# Patient Record
Sex: MALE | Race: WHITE | Employment: UNEMPLOYED | URBAN - METROPOLITAN AREA
[De-identification: names, ages, dates, MRNs, and addresses within clinical notes are randomized per-mention and may not be internally consistent; named-entity substitution may affect disease eponyms.]

---

## 2020-06-10 PROBLEM — R10.11 RIGHT UPPER QUADRANT PAIN: Status: ACTIVE | Noted: 2020-06-10

## 2020-06-11 ENCOUNTER — HOSPITAL ENCOUNTER (INPATIENT)
Age: 55
LOS: 5 days | Discharge: HOME | DRG: 263 | End: 2020-06-16
Attending: INTERNAL MEDICINE | Admitting: INTERNAL MEDICINE
Payer: MEDICAID

## 2020-06-11 ENCOUNTER — APPOINTMENT (OUTPATIENT)
Dept: ULTRASOUND IMAGING | Age: 55
DRG: 263 | End: 2020-06-11
Attending: INTERNAL MEDICINE
Payer: MEDICAID

## 2020-06-11 PROBLEM — K80.50 CHOLEDOCHOLITHIASIS: Status: ACTIVE | Noted: 2020-06-11

## 2020-06-11 PROBLEM — K42.9 UMBILICAL HERNIA: Status: ACTIVE | Noted: 2020-06-11

## 2020-06-11 PROBLEM — E66.9 OBESITY: Status: ACTIVE | Noted: 2020-06-11

## 2020-06-11 PROBLEM — I10 ESSENTIAL HYPERTENSION: Status: ACTIVE | Noted: 2020-06-11

## 2020-06-11 LAB
ABSOLUTE EOS #: 0.19 K/UL (ref 0–0.44)
ABSOLUTE IMMATURE GRANULOCYTE: <0.03 K/UL (ref 0–0.3)
ABSOLUTE LYMPH #: 0.9 K/UL (ref 1.1–3.7)
ABSOLUTE MONO #: 0.46 K/UL (ref 0.1–1.2)
ALBUMIN SERPL-MCNC: 3.7 G/DL (ref 3.5–5.2)
ALBUMIN/GLOBULIN RATIO: 1 (ref 1–2.5)
ALP BLD-CCNC: 279 U/L (ref 40–129)
ALT SERPL-CCNC: 237 U/L (ref 5–41)
ANION GAP SERPL CALCULATED.3IONS-SCNC: 15 MMOL/L (ref 9–17)
AST SERPL-CCNC: 151 U/L
BASOPHILS # BLD: 0 % (ref 0–2)
BASOPHILS ABSOLUTE: <0.03 K/UL (ref 0–0.2)
BILIRUB SERPL-MCNC: 4.68 MG/DL (ref 0.3–1.2)
BUN BLDV-MCNC: 10 MG/DL (ref 6–20)
BUN/CREAT BLD: ABNORMAL (ref 9–20)
CALCIUM SERPL-MCNC: 8.9 MG/DL (ref 8.6–10.4)
CHLORIDE BLD-SCNC: 104 MMOL/L (ref 98–107)
CO2: 18 MMOL/L (ref 20–31)
CREAT SERPL-MCNC: 0.83 MG/DL (ref 0.7–1.2)
DIFFERENTIAL TYPE: ABNORMAL
EOSINOPHILS RELATIVE PERCENT: 2 % (ref 1–4)
GFR AFRICAN AMERICAN: >60 ML/MIN
GFR NON-AFRICAN AMERICAN: >60 ML/MIN
GFR SERPL CREATININE-BSD FRML MDRD: ABNORMAL ML/MIN/{1.73_M2}
GFR SERPL CREATININE-BSD FRML MDRD: ABNORMAL ML/MIN/{1.73_M2}
GLUCOSE BLD-MCNC: 98 MG/DL (ref 70–99)
HCT VFR BLD CALC: 45.2 % (ref 40.7–50.3)
HEMOGLOBIN: 14.8 G/DL (ref 13–17)
IMMATURE GRANULOCYTES: 0 %
LIPASE: 23 U/L (ref 13–60)
LYMPHOCYTES # BLD: 12 % (ref 24–43)
MCH RBC QN AUTO: 31.2 PG (ref 25.2–33.5)
MCHC RBC AUTO-ENTMCNC: 32.7 G/DL (ref 28.4–34.8)
MCV RBC AUTO: 95.2 FL (ref 82.6–102.9)
MONOCYTES # BLD: 6 % (ref 3–12)
NRBC AUTOMATED: 0 PER 100 WBC
PDW BLD-RTO: 13.4 % (ref 11.8–14.4)
PLATELET # BLD: 233 K/UL (ref 138–453)
PLATELET ESTIMATE: ABNORMAL
PMV BLD AUTO: 9.4 FL (ref 8.1–13.5)
POTASSIUM SERPL-SCNC: 3.9 MMOL/L (ref 3.7–5.3)
RBC # BLD: 4.75 M/UL (ref 4.21–5.77)
RBC # BLD: ABNORMAL 10*6/UL
SEG NEUTROPHILS: 80 % (ref 36–65)
SEGMENTED NEUTROPHILS ABSOLUTE COUNT: 6.18 K/UL (ref 1.5–8.1)
SODIUM BLD-SCNC: 137 MMOL/L (ref 135–144)
TOTAL PROTEIN: 7.3 G/DL (ref 6.4–8.3)
TROPONIN INTERP: NORMAL
TROPONIN INTERP: NORMAL
TROPONIN T: NORMAL NG/ML
TROPONIN T: NORMAL NG/ML
TROPONIN, HIGH SENSITIVITY: 6 NG/L (ref 0–22)
TROPONIN, HIGH SENSITIVITY: 7 NG/L (ref 0–22)
WBC # BLD: 7.8 K/UL (ref 3.5–11.3)
WBC # BLD: ABNORMAL 10*3/UL

## 2020-06-11 PROCEDURE — 2500000003 HC RX 250 WO HCPCS: Performed by: NURSE PRACTITIONER

## 2020-06-11 PROCEDURE — 99222 1ST HOSP IP/OBS MODERATE 55: CPT | Performed by: INTERNAL MEDICINE

## 2020-06-11 PROCEDURE — 36415 COLL VENOUS BLD VENIPUNCTURE: CPT

## 2020-06-11 PROCEDURE — 85025 COMPLETE CBC W/AUTO DIFF WBC: CPT

## 2020-06-11 PROCEDURE — 93005 ELECTROCARDIOGRAM TRACING: CPT | Performed by: INTERNAL MEDICINE

## 2020-06-11 PROCEDURE — 6360000002 HC RX W HCPCS: Performed by: INTERNAL MEDICINE

## 2020-06-11 PROCEDURE — 2580000003 HC RX 258: Performed by: INTERNAL MEDICINE

## 2020-06-11 PROCEDURE — 1200000000 HC SEMI PRIVATE

## 2020-06-11 PROCEDURE — 76705 ECHO EXAM OF ABDOMEN: CPT

## 2020-06-11 PROCEDURE — 80053 COMPREHEN METABOLIC PANEL: CPT

## 2020-06-11 PROCEDURE — U0003 INFECTIOUS AGENT DETECTION BY NUCLEIC ACID (DNA OR RNA); SEVERE ACUTE RESPIRATORY SYNDROME CORONAVIRUS 2 (SARS-COV-2) (CORONAVIRUS DISEASE [COVID-19]), AMPLIFIED PROBE TECHNIQUE, MAKING USE OF HIGH THROUGHPUT TECHNOLOGIES AS DESCRIBED BY CMS-2020-01-R: HCPCS

## 2020-06-11 PROCEDURE — 2580000003 HC RX 258: Performed by: NURSE PRACTITIONER

## 2020-06-11 PROCEDURE — 83690 ASSAY OF LIPASE: CPT

## 2020-06-11 PROCEDURE — 84484 ASSAY OF TROPONIN QUANT: CPT

## 2020-06-11 RX ORDER — POTASSIUM CHLORIDE 7.45 MG/ML
10 INJECTION INTRAVENOUS PRN
Status: DISCONTINUED | OUTPATIENT
Start: 2020-06-11 | End: 2020-06-16 | Stop reason: HOSPADM

## 2020-06-11 RX ORDER — SODIUM CHLORIDE 0.9 % (FLUSH) 0.9 %
10 SYRINGE (ML) INJECTION EVERY 12 HOURS SCHEDULED
Status: DISCONTINUED | OUTPATIENT
Start: 2020-06-11 | End: 2020-06-16 | Stop reason: HOSPADM

## 2020-06-11 RX ORDER — MAGNESIUM SULFATE 1 G/100ML
1 INJECTION INTRAVENOUS PRN
Status: DISCONTINUED | OUTPATIENT
Start: 2020-06-11 | End: 2020-06-16 | Stop reason: HOSPADM

## 2020-06-11 RX ORDER — SODIUM CHLORIDE 9 MG/ML
INJECTION, SOLUTION INTRAVENOUS CONTINUOUS
Status: DISCONTINUED | OUTPATIENT
Start: 2020-06-11 | End: 2020-06-13

## 2020-06-11 RX ORDER — HYDRALAZINE HYDROCHLORIDE 20 MG/ML
10 INJECTION INTRAMUSCULAR; INTRAVENOUS EVERY 6 HOURS PRN
Status: DISCONTINUED | OUTPATIENT
Start: 2020-06-11 | End: 2020-06-16 | Stop reason: HOSPADM

## 2020-06-11 RX ORDER — MORPHINE SULFATE 4 MG/ML
4 INJECTION, SOLUTION INTRAMUSCULAR; INTRAVENOUS
Status: DISCONTINUED | OUTPATIENT
Start: 2020-06-11 | End: 2020-06-15

## 2020-06-11 RX ORDER — ACETAMINOPHEN 325 MG/1
650 TABLET ORAL EVERY 4 HOURS PRN
Status: DISCONTINUED | OUTPATIENT
Start: 2020-06-11 | End: 2020-06-15

## 2020-06-11 RX ORDER — MORPHINE SULFATE 2 MG/ML
2 INJECTION, SOLUTION INTRAMUSCULAR; INTRAVENOUS
Status: DISCONTINUED | OUTPATIENT
Start: 2020-06-11 | End: 2020-06-15

## 2020-06-11 RX ORDER — HYDROCODONE BITARTRATE AND ACETAMINOPHEN 5; 325 MG/1; MG/1
2 TABLET ORAL EVERY 4 HOURS PRN
Status: DISCONTINUED | OUTPATIENT
Start: 2020-06-11 | End: 2020-06-15

## 2020-06-11 RX ORDER — ONDANSETRON 2 MG/ML
4 INJECTION INTRAMUSCULAR; INTRAVENOUS EVERY 6 HOURS PRN
Status: DISCONTINUED | OUTPATIENT
Start: 2020-06-11 | End: 2020-06-16 | Stop reason: HOSPADM

## 2020-06-11 RX ORDER — SODIUM CHLORIDE 0.9 % (FLUSH) 0.9 %
10 SYRINGE (ML) INJECTION PRN
Status: DISCONTINUED | OUTPATIENT
Start: 2020-06-11 | End: 2020-06-16 | Stop reason: HOSPADM

## 2020-06-11 RX ORDER — PROMETHAZINE HYDROCHLORIDE 25 MG/1
12.5 TABLET ORAL EVERY 6 HOURS PRN
Status: DISCONTINUED | OUTPATIENT
Start: 2020-06-11 | End: 2020-06-16 | Stop reason: HOSPADM

## 2020-06-11 RX ORDER — HYDROCODONE BITARTRATE AND ACETAMINOPHEN 5; 325 MG/1; MG/1
1 TABLET ORAL EVERY 4 HOURS PRN
Status: DISCONTINUED | OUTPATIENT
Start: 2020-06-11 | End: 2020-06-15

## 2020-06-11 RX ADMIN — SODIUM CHLORIDE: 9 INJECTION, SOLUTION INTRAVENOUS at 18:00

## 2020-06-11 RX ADMIN — Medication 10 ML: at 20:59

## 2020-06-11 RX ADMIN — FAMOTIDINE 20 MG: 10 INJECTION, SOLUTION INTRAVENOUS at 20:59

## 2020-06-11 RX ADMIN — PIPERACILLIN AND TAZOBACTAM 3.38 G: 3; .375 INJECTION, POWDER, LYOPHILIZED, FOR SOLUTION INTRAVENOUS at 18:01

## 2020-06-11 ASSESSMENT — PAIN SCALES - GENERAL: PAINLEVEL_OUTOF10: 0

## 2020-06-11 NOTE — H&P
[facility]    HISTORY AND PHYSICAL EXAMINATION            Date:   6/11/2020  Patient name:  Jessica Hanson  Date of admission:  6/11/2020  3:17 PM  MRN:   [id number]  Account:  [de-identified]  YOB: 1965  PCP:    Josh Farr DO  Room:   1100/2713-49  Code Status:    Full Code    Chief Complaint:     Abdominal pain    History Obtained From:     patient    History of Present Illness: This is 53years old gentleman who was transferred to us from [facility].  Patient presented there with right upper quadrant pain last night. He said the pain was in the right upper quadrant and sometimes on the left side as well. Also had some nausea but no vomiting. No fevers or chills. Pain was colicky in nature. Moderate to severe in intensity. He went to the emergency department and over there he was given GI cocktail and Dilaudid which helped with his pain. Patient says he has been having issues with this for the last 4 months. He says that it started in February and he was told that he needs to have his gallbladder removed however he was overweight for that so every time he has an attack they give him antibiotics and then sent him home. He said he has been trying to lose weight and has lost around 40 pounds in the last 4 months by eating healthy. This time when he presented to [facility] they called his surgeon who recommended transferring to bigger center for evaluation by GI and general surgery. Patient denies any chest pain. He denies any shortness of breath. He denies any coughing or fevers or chills. 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 He denies any chest pain going up or down stairs or exertion. Denies any other medical problems.   Also he has umbilical hernia for which she was told that they will try to repair it when they do his gallbladder surgery  Past Medical History:     No past

## 2020-06-11 NOTE — CONSULTS
known allergies. Social History:   Social History     Socioeconomic History    Marital status:      Spouse name: Not on file    Number of children: Not on file    Years of education: Not on file    Highest education level: Not on file   Occupational History    Not on file   Social Needs    Financial resource strain: Not on file    Food insecurity     Worry: Not on file     Inability: Not on file   [employer] needs     Medical: Not on file     Non-medical: Not on file   Tobacco Use    Smoking status: Former Smoker     Types: Cigarettes    Smokeless tobacco: Current User   Substance and Sexual Activity    Alcohol use: Not on file    Drug use: Not on file    Sexual activity: Not on file   Lifestyle    Physical activity     Days per week: Not on file     Minutes per session: Not on file    Stress: Not on file   Relationships    Social connections     Talks on phone: Not on file     Gets together: Not on file     Attends Yazidism service: Not on file     Active member of club or organization: Not on file     Attends meetings of clubs or organizations: Not on file     Relationship status: Not on file    Intimate partner violence     Fear of current or ex partner: Not on file     Emotionally abused: Not on file     Physically abused: Not on file     Forced sexual activity: Not on file   Other Topics Concern    Not on file   Social History Narrative    Not on file     Family History:   No family history on file. REVIEW OF SYSTEMS:    CONSTITUTIONAL:  No recent weight gain/loss. Energy level normal for pt. HEENT:  No nasal congestion or drainage. CARDIOVASCULAR:  No chest pain  GASTROINTESTINAL: Admits to RUQ abdominal pain, no nausea or vomiting. No constipation/diarrhea. No rectal bleeding  GENITOURINARY:  No dysuria  HEMATOLOGIC/LYMPHATIC:  No easy bruising or bleeding.   No history of cancer  ENDOCRINE: occasional hot or cold intolerance, no h/o DM, no polydipsia   Review of may require MRCP if common bile duct dilation on RUQ US or if hepatic function panel labs are uptrending   4. GI consulted by primary team. Will follow up results.    5. Serial abdominal exams       Electronically signed by Matthew Tatum DO

## 2020-06-12 ENCOUNTER — APPOINTMENT (OUTPATIENT)
Dept: GENERAL RADIOLOGY | Age: 55
DRG: 263 | End: 2020-06-12
Attending: INTERNAL MEDICINE
Payer: MEDICAID

## 2020-06-12 ENCOUNTER — ANESTHESIA (OUTPATIENT)
Dept: OPERATING ROOM | Age: 55
DRG: 263 | End: 2020-06-12
Payer: MEDICAID

## 2020-06-12 ENCOUNTER — ANESTHESIA EVENT (OUTPATIENT)
Dept: OPERATING ROOM | Age: 55
DRG: 263 | End: 2020-06-12
Payer: MEDICAID

## 2020-06-12 VITALS — OXYGEN SATURATION: 97 % | SYSTOLIC BLOOD PRESSURE: 171 MMHG | DIASTOLIC BLOOD PRESSURE: 151 MMHG | TEMPERATURE: 95 F

## 2020-06-12 PROBLEM — E66.09 OBESITY DUE TO EXCESS CALORIES: Status: ACTIVE | Noted: 2020-06-11

## 2020-06-12 PROBLEM — E80.6 HYPERBILIRUBINEMIA: Status: ACTIVE | Noted: 2020-06-12

## 2020-06-12 LAB
ALBUMIN SERPL-MCNC: 3.5 G/DL (ref 3.5–5.2)
ALBUMIN/GLOBULIN RATIO: 1.1 (ref 1–2.5)
ALP BLD-CCNC: 256 U/L (ref 40–129)
ALT SERPL-CCNC: 178 U/L (ref 5–41)
AMYLASE: 31 U/L (ref 28–100)
ANION GAP SERPL CALCULATED.3IONS-SCNC: 15 MMOL/L (ref 9–17)
AST SERPL-CCNC: 99 U/L
BILIRUB SERPL-MCNC: 3.62 MG/DL (ref 0.3–1.2)
BUN BLDV-MCNC: 9 MG/DL (ref 6–20)
BUN/CREAT BLD: ABNORMAL (ref 9–20)
CALCIUM IONIZED: 1.19 MMOL/L (ref 1.13–1.33)
CALCIUM SERPL-MCNC: 8.6 MG/DL (ref 8.6–10.4)
CHLORIDE BLD-SCNC: 105 MMOL/L (ref 98–107)
CO2: 20 MMOL/L (ref 20–31)
CREAT SERPL-MCNC: 0.75 MG/DL (ref 0.7–1.2)
EKG ATRIAL RATE: 78 BPM
EKG P AXIS: 17 DEGREES
EKG P-R INTERVAL: 182 MS
EKG Q-T INTERVAL: 370 MS
EKG QRS DURATION: 94 MS
EKG QTC CALCULATION (BAZETT): 421 MS
EKG R AXIS: -15 DEGREES
EKG T AXIS: 23 DEGREES
EKG VENTRICULAR RATE: 78 BPM
GFR AFRICAN AMERICAN: >60 ML/MIN
GFR NON-AFRICAN AMERICAN: >60 ML/MIN
GFR SERPL CREATININE-BSD FRML MDRD: ABNORMAL ML/MIN/{1.73_M2}
GFR SERPL CREATININE-BSD FRML MDRD: ABNORMAL ML/MIN/{1.73_M2}
GLUCOSE BLD-MCNC: 133 MG/DL (ref 70–99)
HCT VFR BLD CALC: 43.4 % (ref 40.7–50.3)
HEMOGLOBIN: 13.9 G/DL (ref 13–17)
INR BLD: 1
LIPASE: 22 U/L (ref 13–60)
MAGNESIUM: 2.1 MG/DL (ref 1.6–2.6)
MCH RBC QN AUTO: 30.8 PG (ref 25.2–33.5)
MCHC RBC AUTO-ENTMCNC: 32 G/DL (ref 28.4–34.8)
MCV RBC AUTO: 96.2 FL (ref 82.6–102.9)
NRBC AUTOMATED: 0 PER 100 WBC
PDW BLD-RTO: 13.6 % (ref 11.8–14.4)
PHOSPHORUS: 2.3 MG/DL (ref 2.5–4.5)
PLATELET # BLD: 196 K/UL (ref 138–453)
PMV BLD AUTO: 9.7 FL (ref 8.1–13.5)
POTASSIUM SERPL-SCNC: 4.3 MMOL/L (ref 3.7–5.3)
PROTHROMBIN TIME: 10.3 SEC (ref 9–12)
RBC # BLD: 4.51 M/UL (ref 4.21–5.77)
SARS-COV-2, PCR: NORMAL
SARS-COV-2, RAPID: NORMAL
SARS-COV-2: NOT DETECTED
SODIUM BLD-SCNC: 140 MMOL/L (ref 135–144)
SOURCE: NORMAL
TOTAL PROTEIN: 6.7 G/DL (ref 6.4–8.3)
TRIGL SERPL-MCNC: 112 MG/DL
WBC # BLD: 6.6 K/UL (ref 3.5–11.3)

## 2020-06-12 PROCEDURE — 6360000002 HC RX W HCPCS: Performed by: INTERNAL MEDICINE

## 2020-06-12 PROCEDURE — 2709999900 HC NON-CHARGEABLE SUPPLY: Performed by: INTERNAL MEDICINE

## 2020-06-12 PROCEDURE — 97165 OT EVAL LOW COMPLEX 30 MIN: CPT

## 2020-06-12 PROCEDURE — 74328 X-RAY BILE DUCT ENDOSCOPY: CPT

## 2020-06-12 PROCEDURE — 43262 ENDO CHOLANGIOPANCREATOGRAPH: CPT | Performed by: INTERNAL MEDICINE

## 2020-06-12 PROCEDURE — 93010 ELECTROCARDIOGRAM REPORT: CPT | Performed by: INTERNAL MEDICINE

## 2020-06-12 PROCEDURE — 6360000002 HC RX W HCPCS: Performed by: NURSE ANESTHETIST, CERTIFIED REGISTERED

## 2020-06-12 PROCEDURE — 3700000001 HC ADD 15 MINUTES (ANESTHESIA): Performed by: INTERNAL MEDICINE

## 2020-06-12 PROCEDURE — 83690 ASSAY OF LIPASE: CPT

## 2020-06-12 PROCEDURE — C1769 GUIDE WIRE: HCPCS | Performed by: INTERNAL MEDICINE

## 2020-06-12 PROCEDURE — 2580000003 HC RX 258: Performed by: INTERNAL MEDICINE

## 2020-06-12 PROCEDURE — 3700000000 HC ANESTHESIA ATTENDED CARE: Performed by: INTERNAL MEDICINE

## 2020-06-12 PROCEDURE — 1200000000 HC SEMI PRIVATE

## 2020-06-12 PROCEDURE — 97535 SELF CARE MNGMENT TRAINING: CPT

## 2020-06-12 PROCEDURE — 2580000003 HC RX 258: Performed by: NURSE ANESTHETIST, CERTIFIED REGISTERED

## 2020-06-12 PROCEDURE — 84100 ASSAY OF PHOSPHORUS: CPT

## 2020-06-12 PROCEDURE — 83735 ASSAY OF MAGNESIUM: CPT

## 2020-06-12 PROCEDURE — 82330 ASSAY OF CALCIUM: CPT

## 2020-06-12 PROCEDURE — 2500000003 HC RX 250 WO HCPCS: Performed by: NURSE ANESTHETIST, CERTIFIED REGISTERED

## 2020-06-12 PROCEDURE — 85027 COMPLETE CBC AUTOMATED: CPT

## 2020-06-12 PROCEDURE — 0FC98ZZ EXTIRPATION OF MATTER FROM COMMON BILE DUCT, VIA NATURAL OR ARTIFICIAL OPENING ENDOSCOPIC: ICD-10-PCS | Performed by: INTERNAL MEDICINE

## 2020-06-12 PROCEDURE — 84478 ASSAY OF TRIGLYCERIDES: CPT

## 2020-06-12 PROCEDURE — 85610 PROTHROMBIN TIME: CPT

## 2020-06-12 PROCEDURE — 3609018800 HC ERCP DX COLLECTION SPECIMEN BRUSHING/WASHING: Performed by: INTERNAL MEDICINE

## 2020-06-12 PROCEDURE — 36415 COLL VENOUS BLD VENIPUNCTURE: CPT

## 2020-06-12 PROCEDURE — 99254 IP/OBS CNSLTJ NEW/EST MOD 60: CPT | Performed by: INTERNAL MEDICINE

## 2020-06-12 PROCEDURE — 0F798ZZ DILATION OF COMMON BILE DUCT, VIA NATURAL OR ARTIFICIAL OPENING ENDOSCOPIC: ICD-10-PCS | Performed by: INTERNAL MEDICINE

## 2020-06-12 PROCEDURE — 80053 COMPREHEN METABOLIC PANEL: CPT

## 2020-06-12 PROCEDURE — 2500000003 HC RX 250 WO HCPCS: Performed by: INTERNAL MEDICINE

## 2020-06-12 PROCEDURE — 99232 SBSQ HOSP IP/OBS MODERATE 35: CPT | Performed by: INTERNAL MEDICINE

## 2020-06-12 PROCEDURE — 76937 US GUIDE VASCULAR ACCESS: CPT

## 2020-06-12 PROCEDURE — 82150 ASSAY OF AMYLASE: CPT

## 2020-06-12 PROCEDURE — 7100000000 HC PACU RECOVERY - FIRST 15 MIN: Performed by: INTERNAL MEDICINE

## 2020-06-12 PROCEDURE — 2500000003 HC RX 250 WO HCPCS: Performed by: NURSE PRACTITIONER

## 2020-06-12 PROCEDURE — 43264 ERCP REMOVE DUCT CALCULI: CPT | Performed by: INTERNAL MEDICINE

## 2020-06-12 PROCEDURE — 6370000000 HC RX 637 (ALT 250 FOR IP): Performed by: INTERNAL MEDICINE

## 2020-06-12 PROCEDURE — 7100000001 HC PACU RECOVERY - ADDTL 15 MIN: Performed by: INTERNAL MEDICINE

## 2020-06-12 PROCEDURE — 2720000010 HC SURG SUPPLY STERILE: Performed by: INTERNAL MEDICINE

## 2020-06-12 PROCEDURE — BF101ZZ FLUOROSCOPY OF BILE DUCTS USING LOW OSMOLAR CONTRAST: ICD-10-PCS | Performed by: INTERNAL MEDICINE

## 2020-06-12 PROCEDURE — 99222 1ST HOSP IP/OBS MODERATE 55: CPT | Performed by: SURGERY

## 2020-06-12 PROCEDURE — 6360000004 HC RX CONTRAST MEDICATION: Performed by: INTERNAL MEDICINE

## 2020-06-12 RX ORDER — FENTANYL CITRATE 50 UG/ML
50 INJECTION, SOLUTION INTRAMUSCULAR; INTRAVENOUS EVERY 5 MIN PRN
Status: DISCONTINUED | OUTPATIENT
Start: 2020-06-12 | End: 2020-06-12

## 2020-06-12 RX ORDER — PROPOFOL 10 MG/ML
INJECTION, EMULSION INTRAVENOUS PRN
Status: DISCONTINUED | OUTPATIENT
Start: 2020-06-12 | End: 2020-06-12 | Stop reason: SDUPTHER

## 2020-06-12 RX ORDER — SODIUM CHLORIDE 0.9 % (FLUSH) 0.9 %
10 SYRINGE (ML) INJECTION EVERY 12 HOURS SCHEDULED
Status: CANCELLED | OUTPATIENT
Start: 2020-06-12

## 2020-06-12 RX ORDER — FENTANYL CITRATE 50 UG/ML
25 INJECTION, SOLUTION INTRAMUSCULAR; INTRAVENOUS ONCE
Status: CANCELLED | OUTPATIENT
Start: 2020-06-12 | End: 2020-06-12

## 2020-06-12 RX ORDER — SODIUM CHLORIDE, SODIUM LACTATE, POTASSIUM CHLORIDE, CALCIUM CHLORIDE 600; 310; 30; 20 MG/100ML; MG/100ML; MG/100ML; MG/100ML
INJECTION, SOLUTION INTRAVENOUS CONTINUOUS
Status: CANCELLED | OUTPATIENT
Start: 2020-06-12

## 2020-06-12 RX ORDER — SODIUM CHLORIDE 9 MG/ML
INJECTION, SOLUTION INTRAVENOUS CONTINUOUS PRN
Status: DISCONTINUED | OUTPATIENT
Start: 2020-06-12 | End: 2020-06-12 | Stop reason: SDUPTHER

## 2020-06-12 RX ORDER — ONDANSETRON 2 MG/ML
INJECTION INTRAMUSCULAR; INTRAVENOUS PRN
Status: DISCONTINUED | OUTPATIENT
Start: 2020-06-12 | End: 2020-06-12 | Stop reason: SDUPTHER

## 2020-06-12 RX ORDER — SODIUM CHLORIDE 0.9 % (FLUSH) 0.9 %
10 SYRINGE (ML) INJECTION PRN
Status: CANCELLED | OUTPATIENT
Start: 2020-06-12

## 2020-06-12 RX ORDER — ROCURONIUM BROMIDE 10 MG/ML
INJECTION, SOLUTION INTRAVENOUS PRN
Status: DISCONTINUED | OUTPATIENT
Start: 2020-06-12 | End: 2020-06-12 | Stop reason: SDUPTHER

## 2020-06-12 RX ORDER — ONDANSETRON 2 MG/ML
4 INJECTION INTRAMUSCULAR; INTRAVENOUS
Status: DISCONTINUED | OUTPATIENT
Start: 2020-06-12 | End: 2020-06-12

## 2020-06-12 RX ORDER — LIDOCAINE HYDROCHLORIDE 10 MG/ML
INJECTION, SOLUTION INFILTRATION; PERINEURAL PRN
Status: DISCONTINUED | OUTPATIENT
Start: 2020-06-12 | End: 2020-06-12 | Stop reason: SDUPTHER

## 2020-06-12 RX ORDER — FENTANYL CITRATE 50 UG/ML
INJECTION, SOLUTION INTRAMUSCULAR; INTRAVENOUS PRN
Status: DISCONTINUED | OUTPATIENT
Start: 2020-06-12 | End: 2020-06-12 | Stop reason: SDUPTHER

## 2020-06-12 RX ORDER — MIDAZOLAM HYDROCHLORIDE 1 MG/ML
2 INJECTION INTRAMUSCULAR; INTRAVENOUS
Status: CANCELLED | OUTPATIENT
Start: 2020-06-12 | End: 2020-06-12

## 2020-06-12 RX ORDER — ONDANSETRON 2 MG/ML
4 INJECTION INTRAMUSCULAR; INTRAVENOUS ONCE
Status: CANCELLED | OUTPATIENT
Start: 2020-06-12 | End: 2020-06-12

## 2020-06-12 RX ORDER — AMLODIPINE BESYLATE 5 MG/1
5 TABLET ORAL DAILY
Status: DISCONTINUED | OUTPATIENT
Start: 2020-06-12 | End: 2020-06-14

## 2020-06-12 RX ADMIN — PROPOFOL 300 MG: 10 INJECTION, EMULSION INTRAVENOUS at 13:42

## 2020-06-12 RX ADMIN — LIDOCAINE HYDROCHLORIDE 50 MG: 10 INJECTION, SOLUTION INFILTRATION; PERINEURAL at 13:42

## 2020-06-12 RX ADMIN — SODIUM CHLORIDE: 9 INJECTION, SOLUTION INTRAVENOUS at 18:43

## 2020-06-12 RX ADMIN — FAMOTIDINE 20 MG: 10 INJECTION, SOLUTION INTRAVENOUS at 20:16

## 2020-06-12 RX ADMIN — PIPERACILLIN AND TAZOBACTAM 3.38 G: 3; .375 INJECTION, POWDER, LYOPHILIZED, FOR SOLUTION INTRAVENOUS at 18:33

## 2020-06-12 RX ADMIN — SUGAMMADEX 300 MG: 100 INJECTION, SOLUTION INTRAVENOUS at 14:22

## 2020-06-12 RX ADMIN — ENOXAPARIN SODIUM 40 MG: 40 INJECTION SUBCUTANEOUS at 20:16

## 2020-06-12 RX ADMIN — ROCURONIUM BROMIDE 50 MG: 10 INJECTION INTRAVENOUS at 13:42

## 2020-06-12 RX ADMIN — FENTANYL CITRATE 100 MCG: 50 INJECTION INTRAMUSCULAR; INTRAVENOUS at 13:42

## 2020-06-12 RX ADMIN — HYDRALAZINE HYDROCHLORIDE 10 MG: 20 INJECTION INTRAMUSCULAR; INTRAVENOUS at 16:51

## 2020-06-12 RX ADMIN — AMLODIPINE BESYLATE 5 MG: 5 TABLET ORAL at 20:16

## 2020-06-12 RX ADMIN — ONDANSETRON 4 MG: 2 INJECTION, SOLUTION INTRAMUSCULAR; INTRAVENOUS at 14:17

## 2020-06-12 RX ADMIN — SODIUM CHLORIDE: 9 INJECTION, SOLUTION INTRAVENOUS at 13:33

## 2020-06-12 RX ADMIN — PIPERACILLIN AND TAZOBACTAM 3.38 G: 3; .375 INJECTION, POWDER, LYOPHILIZED, FOR SOLUTION INTRAVENOUS at 08:49

## 2020-06-12 RX ADMIN — FAMOTIDINE 20 MG: 10 INJECTION, SOLUTION INTRAVENOUS at 08:49

## 2020-06-12 RX ADMIN — PIPERACILLIN AND TAZOBACTAM 3.38 G: 3; .375 INJECTION, POWDER, LYOPHILIZED, FOR SOLUTION INTRAVENOUS at 01:00

## 2020-06-12 ASSESSMENT — PULMONARY FUNCTION TESTS
PIF_VALUE: 23
PIF_VALUE: 19
PIF_VALUE: 31
PIF_VALUE: 24
PIF_VALUE: 0
PIF_VALUE: 2
PIF_VALUE: 1
PIF_VALUE: 23
PIF_VALUE: 31
PIF_VALUE: 2
PIF_VALUE: 2
PIF_VALUE: 0
PIF_VALUE: 18
PIF_VALUE: 32
PIF_VALUE: 32
PIF_VALUE: 24
PIF_VALUE: 23
PIF_VALUE: 23
PIF_VALUE: 30
PIF_VALUE: 22
PIF_VALUE: 26
PIF_VALUE: 33
PIF_VALUE: 9
PIF_VALUE: 23
PIF_VALUE: 3
PIF_VALUE: 24
PIF_VALUE: 31
PIF_VALUE: 1
PIF_VALUE: 34
PIF_VALUE: 3
PIF_VALUE: 2
PIF_VALUE: 32
PIF_VALUE: 32
PIF_VALUE: 3
PIF_VALUE: 32
PIF_VALUE: 29
PIF_VALUE: 22
PIF_VALUE: 23
PIF_VALUE: 18
PIF_VALUE: 31
PIF_VALUE: 32
PIF_VALUE: 0
PIF_VALUE: 34
PIF_VALUE: 31
PIF_VALUE: 1
PIF_VALUE: 31
PIF_VALUE: 31
PIF_VALUE: 12
PIF_VALUE: 31
PIF_VALUE: 19
PIF_VALUE: 22
PIF_VALUE: 23
PIF_VALUE: 2
PIF_VALUE: 23
PIF_VALUE: 19
PIF_VALUE: 32
PIF_VALUE: 24
PIF_VALUE: 34

## 2020-06-12 ASSESSMENT — PAIN - FUNCTIONAL ASSESSMENT: PAIN_FUNCTIONAL_ASSESSMENT: 0-10

## 2020-06-12 ASSESSMENT — PAIN SCALES - GENERAL
PAINLEVEL_OUTOF10: 0
PAINLEVEL_OUTOF10: 0

## 2020-06-12 NOTE — PROGRESS NOTES
[address]    Progress Note    6/12/2020    12:45 PM    Name:   Shahnaz Khan  MRN:     [id number]     Acct:      [de-identified]   Room:   ST OR [place] RM/NONE  IP Day:  1  Admit Date:  6/11/2020  3:17 PM    PCP:   Shawn Lam DO  Code Status:  Full Code    Subjective:     C/C: No chief complaint on file. RUQ pain  Interval History Status: improved. Patient says his pain is better, denies chest pain, denies shortness of breath, he does have elevated bilirubin, blood pressure is slightly on the higher side but says this could be related to pain    Brief History: This is 53years old gentleman who was transferred to us from [facility].  Patient presented there with right upper quadrant pain last night. He said the pain was in the right upper quadrant and sometimes on the left side as well. Also had some nausea but no vomiting. No fevers or chills. Pain was colicky in nature. Moderate to severe in intensity. He went to the emergency department and over there he was given GI cocktail and Dilaudid which helped with his pain. Patient says he has been having issues with this for the last 4 months. He says that it started in February and he was told that he needs to have his gallbladder removed however he was overweight for that so every time he has an attack they give him antibiotics and then sent him home. He said he has been trying to lose weight and has lost around 40 pounds in the last 4 months by eating healthy. This time when he presented to [facility] they called his surgeon who recommended transferring to [facility] center for evaluation by GI and general surgery. Patient denies any chest pain. He denies any shortness of breath. He denies any coughing or fevers or chills. 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 He denies any chest pain going up or down stairs or exertion.   Denies any other medical problems. Also he has umbilical hernia for which she was told that they will try to repair it when they do his gallbladder surgery    Review of Systems:     Constitutional:  negative for chills, fevers, sweats  Respiratory:  negative for cough, dyspnea on exertion, shortness of breath, wheezing  Cardiovascular:  negative for chest pain, chest pressure/discomfort, lower extremity edema, palpitations  Gastrointestinal:  negative for abdominal pain, constipation, diarrhea, nausea, vomiting  Neurological:  negative for dizziness, headache    Medications: Allergies:  No Known Allergies    Current Meds:   Scheduled Meds:    [MAR Hold] enoxaparin  40 mg Subcutaneous BID    [MAR Hold] famotidine (PEPCID) injection  20 mg Intravenous BID    [MAR Hold] sodium chloride flush  10 mL Intravenous 2 times per day    [MAR Hold] piperacillin-tazobactam  3.375 g Intravenous Q8H     Continuous Infusions:    [MAR Hold] sodium chloride 75 mL/hr at 06/11/20 1800     PRN Meds: [MAR Hold] fentanNYL, [MAR Hold] fentanNYL, [MAR Hold] ondansetron, [MAR Hold] acetaminophen, [MAR Hold] HYDROcodone 5 mg - acetaminophen **OR** [MAR Hold] HYDROcodone 5 mg - acetaminophen, [MAR Hold] HYDROmorphone **OR** [MAR Hold] HYDROmorphone, [MAR Hold] magnesium sulfate, [MAR Hold] potassium chloride, [MAR Hold] promethazine **OR** [MAR Hold] ondansetron, [MAR Hold] sodium chloride flush, [MAR Hold] morphine **OR** [MAR Hold] morphine, [MAR Hold] hydrALAZINE    Data:     Past Medical History:   has a past medical history of Alcohol use disorder, mild, abuse, Choledocholithiasis, HTN (hypertension), Obesity, Pain, Umbilical hernia, and Weight loss. Social History:   reports that he has quit smoking. His smoking use included cigarettes. He uses smokeless tobacco.     Family History: No family history on file.     Vitals:  BP (!) 179/106   Pulse 81   Temp 98.2 °F (36.8 °C) (Temporal)   Resp 20   Ht 5' 11\" (1.803 m)   Wt (!) 343 lb (155.6 kg)   SpO2 98%   BMI 47.84 kg/m²   Temp (24hrs), Av.5 °F (36.9 °C), Min:98.2 °F (36.8 °C), Max:98.7 °F (37.1 °C)    No results for input(s): POCGLU in the last 72 hours. I/O (24Hr): Intake/Output Summary (Last 24 hours) at 2020 1245  Last data filed at 2020 0501  Gross per 24 hour   Intake 1370 ml   Output --   Net 1370 ml       Labs:  Hematology:  Recent Labs     209   WBC 7.8 6.6   RBC 4.75 4.51   HGB 14.8 13.9   HCT 45.2 43.4   MCV 95.2 96.2   MCH 31.2 30.8   MCHC 32.7 32.0   RDW 13.4 13.6    196   MPV 9.4 9.7   INR  --  1.0     Chemistry:  Recent Labs     20     --  140   K 3.9  --  4.3     --  105   CO2 18*  --  20   GLUCOSE 98  --  133*   BUN 10  --  9   CREATININE 0.83  --  0.75   MG  --   --  2.1   ANIONGAP 15  --  15   LABGLOM >60  --  >60   GFRAA >60  --  >60   CALCIUM 8.9  --  8.6   CAION  --   --  1.19   PHOS  --   --  2.3*   TROPHS 6 7  --      Recent Labs     20   PROT 7.3 6.7   LABALBU 3.7 3.5   * 99*   * 178*   ALKPHOS 279* 256*   BILITOT 4.68* 3.62*   AMYLASE  --  31   LIPASE 23 22   TRIG  --  112     ABG:No results found for: POCPH, PHART, PH, POCPCO2, NEX8JBM, PCO2, POCPO2, PO2ART, PO2, POCHCO3, WSO5IFA, HCO3, NBEA, PBEA, BEART, BE, THGBART, THB, JNR3HNT, NOFI2BLW, F6IJIWWU, O2SAT, FIO2  No results found for: SPECIAL  No results found for: CULTURE    Radiology:  Us Gallbladder Ruq    Result Date: 2020  Limited study due to body habitus and overlying bowel gas. Diffuse hepatic steatosis. Cholelithiasis without sonographic evidence of acute cholecystitis. There is dilation of the common bile duct. No obvious stone is noted however correlation with patient's liver function test as well as bilirubin levels recommended. MRCP can be obtained as clinically indicated.        Physical Examination:        General appearance:  alert, cooperative and no distress  Mental Status:  oriented to person, place and time and normal affect  Lungs:  clear to auscultation bilaterally, normal effort  Heart:  regular rate and rhythm, no murmur  Abdomen:  soft, nontender, nondistended, normal bowel sounds, no masses, hepatomegaly, splenomegaly  Extremities:  no edema, redness, tenderness in the calves  Skin:  no gross lesions, rashes, induration    Assessment:        Hospital Problems           Last Modified POA    Right upper quadrant pain 6/10/2020 Yes    Choledocholithiasis 9/45/6248 Yes    Umbilical hernia 6/68/5867 Yes    Essential hypertension 6/11/2020 Yes    Morbid obesity 6/12/2020 Yes    Hyperbilirubinemia 6/12/2020 Yes          Plan:        1. Plan for ERCP today because of elevated bilirubin and gallbladder ultrasound findings  2. Continue Zosyn  3. Continue IV fluids  4. Monitor blood pressure and if remains elevated will start him on medication, continue to use hydralazine as needed  5.  DVT prophylaxis after procedure from tomorrow    Altagracia Tang MD  6/12/2020  12:45 PM

## 2020-06-12 NOTE — PROGRESS NOTES
GI team plans for ERCP today 6/12. Will discontinue MRCP. Surgery plans for robotic laparoscopic assisted cholecystectomy following ERCP. Cholecystectomy tentatively Monday 6/15.        Madelin Rene, DO  General Surgery PGY-1

## 2020-06-12 NOTE — PROGRESS NOTES
Nutrition Assessment (Low Risk)    Type and Reason for Visit: Initial, Positive Nutrition Screen(Wt loss)    Nutrition Recommendations:   -Continue NPO status   -Start diet as able   -Pt denied all nutritional supplements at this time      Nutrition Assessment:  Patient assessed for nutritional risk. Pt stated that he intentionally lost 46 lbs over the past 4 mo in prep for his Gallbladder procedure. Pt has avoided fried foods and has been eating more fruits and vegetables. Pt is very happy w/ his wt loss and hopes it continues. Pt stated that he has a good appetite. No wounds noted. Full nutrition assessment not needed at this time. Deemed to be at low risk at this time. Will continue to monitor for changes in status.       Malnutrition Assessment:  · Malnutrition Status: No malnutrition    Nutrition Risk Level   Risk Level: Low    Nutrition Diagnosis:   · Problem: No nutrition diagnosis at this time    Nutrition Intervention:  Food and/or Delivery: Continue NPO(Start diet as able )  Nutrition Education/Counseling/Coordination of Care:  Continued Inpatient Monitoring, Education Not Indicated      Electronically signed by Carmen Cote RD, CATHRYN on 6/12/20 at 12:03 PM EDT    Contact Number: [phone]

## 2020-06-12 NOTE — CARE COORDINATION
Case Management Initial Discharge Plan  Deisy Medina,             Met with:patient to discuss discharge plans. Information verified: address, contacts, phone number, , insurance Yes    Emergency Contact/Next of Kin name & number:  Parent  and wife    PCP: Jai Mandel DO  Date of last visit: 4 months ago  Insurance Provider:  Pending medicaid    Discharge Planning    Living Arrangements:  Spouse/Significant Other   Support Systems:  Family Members, Friends/Neighbors    Home has  2 stories   4-5 steps stairs to climb to get into front door,  I flight stairs to climb to reach second floor  Location of bedroom/bathroom in home  Main level    Patient able to perform ADL's:Independent    Current Services (outpatient & in home) none  DME equipment: none  DME provider:     Receiving oral anticoagulation therapy? No    If indicated:   Physician managing anticoagulation treatment:   Where does patient obtain lab work for ATC treatment? Potential Assistance Needed:  N/A    Patient agreeable to home care: No  [place] of choice provided:  n/a    Prior SNF/Rehab Placement and Facility:   Agreeable to SNF/Rehab: No  [place] of choice provided: n/a     Evaluation: no    Expected Discharge date:  20    Patient expects to be discharged to:  home  Follow Up Appointment: Best Day/ Time: Monday AM    Transportation provider:  family  Transportation arrangements needed for discharge: No    Readmission Risk              Risk of Unplanned Readmission:        8             Does patient have a readmission risk score greater than 14?: No  If yes, follow-up appointment must be made within 7 days of discharge. Goals of Care: determination of tx for his issue.   Has been going to dr  For 4 months to get answers    Discharge Plan:  Home with wife          Electronically signed by Clint Wells RN on 20 at 9:53 AM EDT

## 2020-06-12 NOTE — CONSULTS
[address]    GASTROENTEROLOGY CONSULT    Patient:   Court Khan   :    1965   Facility:   [facility]   Date:    2020  Admission Dx:  Right upper quadrant pain [R10.11]  Requesting physician: Cyn Kerr MD  Reason for consult:  Biliary pain       SUBJECTIVE     HISTORY OF PRESENT ILLNESS  This is a 53 y.o.   male who was admitted 2020 with Right upper quadrant pain [R10.11]. We have been asked to see the patient in consultation by Cyn Kerr MD for biliary pain. 54 y/o male with hx of morbid obesity and alcohol use disorder presented as transfer from [facility] for surgical and GI evaluation after presenting with severe RUQ pain. Patient has a 4 month hx of intermittent abdominal pain. Pain varies between constant and intermittent, sharp and pressure type. Pain will radiate to back and to left upper quadrant. He has had associated chills and rigors. Patient  and has been evaluated in past for cholecystectomy. Patient was advised weight loss prior to surgery. Patient reports he has lost 43 pounds, 8 pounds more than recommended. He was seen at [facility] with ongoing abdominal pain. LFTs were abnormal.  Patient transferred to tertiary center for further care. Right upper quadrant ultrasound shows hepatic steatosis, cholelithiasis without evidence of acute cholecystitis. Dilation of CBD to 14.1 mm. Labs reveal abnormal LFTs suggestive of obstructive pattern -alkaline phosphatase 279, , , total bilirubin 4.68. Patient reports a history of alcohol use and will drink 4-5 alcoholic beverages on social occasions. Denies daily alcohol use. No new or over-the-counter medications. No acetaminophen use. Currently denies any abdominal pain. He denies prior EGD or colonoscopy. OBJECTIVE:     PAST MEDICAL/SURGICAL HISTORY  No past medical history on file.   No past surgical history on Trachea midline  LUNGS:  CTA Bilaterally  HEART:  RRR, No murmur  ABDOMEN:   Soft, Nontender, Nondistended, BS WNL  EXT:   No clubbing. No cyanosis. No edema. SKIN:   No rashes. Jaundice. No stigmata of liver disease. MUSC/SKEL:   Adequate muscle bulk for patient's age, No significant synovitis, No deformities  NEURO:  A&O x Three, CN II- XII grossly intact      LABS AND IMAGING:     CBC  Recent Labs     06/11/20 1745 06/12/20  0449   WBC 7.8 6.6   HGB 14.8 13.9   HCT 45.2 43.4   MCV 95.2 96.2   MCH 31.2 30.8   MCHC 32.7 32.0    196       BMP  Recent Labs     06/11/20 1745 06/12/20  0449    140   K 3.9 4.3    105   CO2 18* 20   BUN 10 9   CREATININE 0.83 0.75   GLUCOSE 98 133*   CALCIUM 8.9 8.6       LFTS  Recent Labs     06/11/20 1745 06/12/20  0449   ALKPHOS 279* 256*   * 178*   * 99*   PROT 7.3 6.7   BILITOT 4.68* 3.62*   LABALBU 3.7 3.5       AMYLASE/LIPASE/AMMONIA  Recent Labs     06/11/20 1745 06/12/20 0449   AMYLASE  --  31   LIPASE 23 22       PT/INR  Recent Labs     06/12/20  0449   PROTIME 10.3   INR 1.0       ANEMIA STUDIES  No results for input(s): IRON, LABIRON, TIBC, UIBC, FERRITIN, [id number], FOLATE, OCCULTBLD in the last 72 hours. IMAGING  Us Gallbladder Ruq    Result Date: 6/11/2020  EXAMINATION: RIGHT UPPER QUADRANT ULTRASOUND 6/11/2020 5:06 pm COMPARISON: None. HISTORY: ORDERING SYSTEM PROVIDED HISTORY: RUQ pain TECHNOLOGIST PROVIDED HISTORY: RUQ pain FINDINGS: Study is limited by body habitus and overlying bowel gas. LIVER:  Liver is diffusely increased in echogenicity. No focal lesion noted on limited imaging. Portal venous flow as in expected direction. BILIARY SYSTEM:  Stones are noted within the gallbladder and there is no wall thickening or pericholecystic fluid. Patient reports no sonographic Gonzales's sign.  Common bile duct is dilated measuring 14.1 mm. RIGHT KIDNEY: The right kidney is grossly unremarkable without evidence of

## 2020-06-12 NOTE — ANESTHESIA PRE PROCEDURE
Tobacco Use    Smoking status: Former Smoker     Types: Cigarettes    Smokeless tobacco: Current User   Substance Use Topics    Alcohol use: Not on file                                Ready to quit: Not Answered  Counseling given: Not Answered      Vital Signs (Current):   Vitals:    06/11/20 2200 06/12/20 0501 06/12/20 1151 06/12/20 1200   BP: (!) 148/91 (!) 146/90 (!) 192/86 (!) 179/106   Pulse:  88 81    Resp:  16 20    Temp:  98.6 °F (37 °C) 98.2 °F (36.8 °C)    TempSrc:  Oral Temporal    SpO2:   98%    Weight:   (!) 343 lb (155.6 kg)    Height:   5' 11\" (1.803 m)                                               BP Readings from Last 3 Encounters:   06/12/20 (!) 179/106       NPO Status: Time of last liquid consumption: 2000                        Time of last solid consumption: 1700                        Date of last liquid consumption: 06/11/20                        Date of last solid food consumption: 06/10/20    BMI:   Wt Readings from Last 3 Encounters:   06/12/20 (!) 343 lb (155.6 kg)     Body mass index is 47.84 kg/m². CBC:   Lab Results   Component Value Date    WBC 6.6 06/12/2020    RBC 4.51 06/12/2020    HGB 13.9 06/12/2020    HCT 43.4 06/12/2020    MCV 96.2 06/12/2020    RDW 13.6 06/12/2020     06/12/2020       CMP:   Lab Results   Component Value Date     06/12/2020    K 4.3 06/12/2020     06/12/2020    CO2 20 06/12/2020    BUN 9 06/12/2020    CREATININE 0.75 06/12/2020    GFRAA >60 06/12/2020    LABGLOM >60 06/12/2020    GLUCOSE 133 06/12/2020    PROT 6.7 06/12/2020    CALCIUM 8.6 06/12/2020    BILITOT 3.62 06/12/2020    ALKPHOS 256 06/12/2020    AST 99 06/12/2020     06/12/2020       POC Tests: No results for input(s): POCGLU, POCNA, POCK, POCCL, POCBUN, POCHEMO, POCHCT in the last 72 hours.     Coags:   Lab Results   Component Value Date    PROTIME 10.3 06/12/2020    INR 1.0 06/12/2020       HCG (If Applicable): No results found for: PREGTESTUR, PREGSERUM, HCG,

## 2020-06-12 NOTE — PROGRESS NOTES
General Surgery:  Daily Progress Note          PATIENT NAME: Pricilla Suárez     TODAY'S DATE: 6/12/2020, 7:05 AM  CC:  RUQ abdominal pain     SUBJECTIVE:     Pt seen and examined at bedside. No acute events overnight. Afebrile, HD stable. Currently without any abdominal pain this AM. Denies nausea, emesis and states he is feeling hungry. RUQ US 6/11 with cholelithiasis and common bile duct dilation, no gallbladder wall thickening or pericholecystic fluid and initial labs on arrival here 6/11 Alk Phos 279, , , TBili 4.68. OBJECTIVE:   VITALS:  BP (!) 146/90   Pulse 88   Temp 98.6 °F (37 °C) (Oral)   Resp 16   Ht 5' 11\" (1.803 m)   Wt (!) 343 lb (155.6 kg)   SpO2 99%   BMI 47.84 kg/m²      INTAKE/OUTPUT:      Intake/Output Summary (Last 24 hours) at 6/12/2020 0705  Last data filed at 6/12/2020 0501  Gross per 24 hour   Intake 1370 ml   Output --   Net 1370 ml       PHYSICAL EXAM:  General Appearance:  awake, alert, oriented, in no acute distress  HEENT:  Normocephalic, atraumatic, mucus membranes moist   Skin:  Skin color, texture, turgor normal. No rashes or lesions. Lungs:  Effort normal, no respiratory distress, no accessory muscle use   Heart:  Regular rate and rhythm   Abdomen: Soft, ND, NTTP, no guarding or peritoneal signs   Extremities: Extremities warm to touch, pink, with no edema.         Data:  CBC:   Lab Results   Component Value Date    WBC 6.6 06/12/2020    RBC 4.51 06/12/2020    HGB 13.9 06/12/2020    HCT 43.4 06/12/2020    MCV 96.2 06/12/2020    MCH 30.8 06/12/2020    MCHC 32.0 06/12/2020    RDW 13.6 06/12/2020     06/12/2020    MPV 9.7 06/12/2020     CMP:    Lab Results   Component Value Date     06/12/2020    K 4.3 06/12/2020     06/12/2020    CO2 20 06/12/2020    BUN 9 06/12/2020    CREATININE 0.75 06/12/2020    GFRAA >60 06/12/2020    LABGLOM >60 06/12/2020    GLUCOSE 133 06/12/2020    PROT 6.7 06/12/2020    LABALBU 3.5 06/12/2020    CALCIUM 8.6

## 2020-06-13 LAB
ALBUMIN SERPL-MCNC: 3.6 G/DL (ref 3.5–5.2)
ALBUMIN/GLOBULIN RATIO: 1 (ref 1–2.5)
ALP BLD-CCNC: 222 U/L (ref 40–129)
ALT SERPL-CCNC: 123 U/L (ref 5–41)
ANION GAP SERPL CALCULATED.3IONS-SCNC: 9 MMOL/L (ref 9–17)
AST SERPL-CCNC: 48 U/L
BILIRUB SERPL-MCNC: 1.1 MG/DL (ref 0.3–1.2)
BUN BLDV-MCNC: 11 MG/DL (ref 6–20)
BUN/CREAT BLD: ABNORMAL (ref 9–20)
CALCIUM SERPL-MCNC: 8.8 MG/DL (ref 8.6–10.4)
CHLORIDE BLD-SCNC: 101 MMOL/L (ref 98–107)
CO2: 23 MMOL/L (ref 20–31)
CREAT SERPL-MCNC: 0.95 MG/DL (ref 0.7–1.2)
GFR AFRICAN AMERICAN: >60 ML/MIN
GFR NON-AFRICAN AMERICAN: >60 ML/MIN
GFR SERPL CREATININE-BSD FRML MDRD: ABNORMAL ML/MIN/{1.73_M2}
GFR SERPL CREATININE-BSD FRML MDRD: ABNORMAL ML/MIN/{1.73_M2}
GLUCOSE BLD-MCNC: 127 MG/DL (ref 70–99)
HCT VFR BLD CALC: 43.9 % (ref 40.7–50.3)
HEMOGLOBIN: 14.3 G/DL (ref 13–17)
MCH RBC QN AUTO: 31.2 PG (ref 25.2–33.5)
MCHC RBC AUTO-ENTMCNC: 32.6 G/DL (ref 28.4–34.8)
MCV RBC AUTO: 95.9 FL (ref 82.6–102.9)
NRBC AUTOMATED: 0 PER 100 WBC
PDW BLD-RTO: 13.6 % (ref 11.8–14.4)
PLATELET # BLD: 242 K/UL (ref 138–453)
PMV BLD AUTO: 9.6 FL (ref 8.1–13.5)
POTASSIUM SERPL-SCNC: 4.2 MMOL/L (ref 3.7–5.3)
RBC # BLD: 4.58 M/UL (ref 4.21–5.77)
SODIUM BLD-SCNC: 133 MMOL/L (ref 135–144)
TOTAL PROTEIN: 7.1 G/DL (ref 6.4–8.3)
WBC # BLD: 6.4 K/UL (ref 3.5–11.3)

## 2020-06-13 PROCEDURE — 85027 COMPLETE CBC AUTOMATED: CPT

## 2020-06-13 PROCEDURE — 1200000000 HC SEMI PRIVATE

## 2020-06-13 PROCEDURE — 6360000002 HC RX W HCPCS: Performed by: INTERNAL MEDICINE

## 2020-06-13 PROCEDURE — APPNB45 APP NON BILLABLE 31-45 MINUTES: Performed by: INTERNAL MEDICINE

## 2020-06-13 PROCEDURE — 99232 SBSQ HOSP IP/OBS MODERATE 35: CPT | Performed by: INTERNAL MEDICINE

## 2020-06-13 PROCEDURE — 2580000003 HC RX 258: Performed by: INTERNAL MEDICINE

## 2020-06-13 PROCEDURE — 6370000000 HC RX 637 (ALT 250 FOR IP): Performed by: INTERNAL MEDICINE

## 2020-06-13 PROCEDURE — 36415 COLL VENOUS BLD VENIPUNCTURE: CPT

## 2020-06-13 PROCEDURE — 80053 COMPREHEN METABOLIC PANEL: CPT

## 2020-06-13 PROCEDURE — 2500000003 HC RX 250 WO HCPCS: Performed by: INTERNAL MEDICINE

## 2020-06-13 RX ADMIN — Medication 10 ML: at 21:25

## 2020-06-13 RX ADMIN — PIPERACILLIN AND TAZOBACTAM 3.38 G: 3; .375 INJECTION, POWDER, LYOPHILIZED, FOR SOLUTION INTRAVENOUS at 08:43

## 2020-06-13 RX ADMIN — FAMOTIDINE 20 MG: 10 INJECTION, SOLUTION INTRAVENOUS at 21:23

## 2020-06-13 RX ADMIN — PIPERACILLIN AND TAZOBACTAM 3.38 G: 3; .375 INJECTION, POWDER, LYOPHILIZED, FOR SOLUTION INTRAVENOUS at 01:24

## 2020-06-13 RX ADMIN — ACETAMINOPHEN 650 MG: 325 TABLET ORAL at 06:43

## 2020-06-13 RX ADMIN — PIPERACILLIN AND TAZOBACTAM 3.38 G: 3; .375 INJECTION, POWDER, LYOPHILIZED, FOR SOLUTION INTRAVENOUS at 16:54

## 2020-06-13 RX ADMIN — ENOXAPARIN SODIUM 40 MG: 40 INJECTION SUBCUTANEOUS at 21:23

## 2020-06-13 RX ADMIN — FAMOTIDINE 20 MG: 10 INJECTION, SOLUTION INTRAVENOUS at 09:33

## 2020-06-13 RX ADMIN — AMLODIPINE BESYLATE 5 MG: 5 TABLET ORAL at 08:43

## 2020-06-13 ASSESSMENT — PAIN SCALES - GENERAL: PAINLEVEL_OUTOF10: 3

## 2020-06-13 NOTE — PLAN OF CARE
Problem: Activity:  Goal: Risk for activity intolerance will decrease  Description: Risk for activity intolerance will decrease  6/13/2020 0316 by Lucian Finch RN  Outcome: Ongoing     Problem: Bowel/Gastric:  Goal: Bowel function will improve  Description: Bowel function will improve  6/13/2020 0316 by Lucian Finch RN  Outcome: Ongoing     Problem: Bowel/Gastric:  Goal: Diagnostic test results will improve  Description: Diagnostic test results will improve  6/13/2020 0316 by Lucian Finch RN  Outcome: Ongoing     Problem: Bowel/Gastric:  Goal: Occurrences of nausea will decrease  Description: Occurrences of nausea will decrease  6/13/2020 0316 by Lucian Finch RN  Outcome: Ongoing     Problem:  Bowel/Gastric:  Goal: Occurrences of vomiting will decrease  Description: Occurrences of vomiting will decrease  6/13/2020 0316 by Lucian Finch RN  Outcome: Ongoing     Problem: Cardiac:  Goal: Ability to maintain an adequate cardiac output will improve  Description: Ability to maintain an adequate cardiac output will improve  Outcome: Ongoing     Problem: Physical Regulation:  Goal: Complications related to the disease process, condition or treatment will be avoided or minimized  Description: Complications related to the disease process, condition or treatment will be avoided or minimized  Outcome: Ongoing     Problem: Safety:  Goal: Ability to remain free from injury will improve  Outcome: Ongoing

## 2020-06-13 NOTE — PROGRESS NOTES
Cuc[facility] 18    Progress Note    6/13/2020    2:09 PM    Name:   Veronica Adams  MRN:     [id number]     Acct:      [de-identified]   Room:   [facility] [address]  IP Day:  2  Admit Date:  6/11/2020  3:17 PM    PCP:   Kelsie Lieberman DO  Code Status:  Full Code    Subjective:     C/C: No chief complaint on file. RUQ pain  Interval History Status: improved. Patient underwent ERCP yesterday with removal of 2 stones. He has significant improvement in his LFTs. His symptoms are better. Blood pressure is better but elevated  Brief History: This is 53years old gentleman who was transferred to us from [facility].  Patient presented there with right upper quadrant pain last night. He said the pain was in the right upper quadrant and sometimes on the left side as well. Also had some nausea but no vomiting. No fevers or chills. Pain was colicky in nature. Moderate to severe in intensity. He went to the emergency department and over there he was given GI cocktail and Dilaudid which helped with his pain. Patient says he has been having issues with this for the last 4 months. He says that it started in February and he was told that he needs to have his gallbladder removed however he was overweight for that so every time he has an attack they give him antibiotics and then sent him home. He said he has been trying to lose weight and has lost around 40 pounds in the last 4 months by eating healthy. This time when he presented to [facility] they called his surgeon who recommended transferring to bigger center for evaluation by GI and general surgery. Patient denies any chest pain. He denies any shortness of breath. He denies any coughing or fevers or chills. 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 He denies any chest pain going up or down stairs or exertion. Denies any other medical problems.   Also he has umbilical 6/13/2020 0526  Gross per 24 hour   Intake 800 ml   Output --   Net 800 ml       Labs:  Hematology:  Recent Labs     06/11/20 1745 06/12/20 0449 06/13/20  0735   WBC 7.8 6.6 6.4   RBC 4.75 4.51 4.58   HGB 14.8 13.9 14.3   HCT 45.2 43.4 43.9   MCV 95.2 96.2 95.9   MCH 31.2 30.8 31.2   MCHC 32.7 32.0 32.6   RDW 13.4 13.6 13.6    196 242   MPV 9.4 9.7 9.6   INR  --  1.0  --      Chemistry:  Recent Labs     06/11/20 1745 06/11/20 1937 06/12/20 0449 06/13/20  0735     --  140 133*   K 3.9  --  4.3 4.2     --  105 101   CO2 18*  --  20 23   GLUCOSE 98  --  133* 127*   BUN 10  --  9 11   CREATININE 0.83  --  0.75 0.95   MG  --   --  2.1  --    ANIONGAP 15  --  15 9   LABGLOM >60  --  >60 >60   GFRAA >60  --  >60 >60   CALCIUM 8.9  --  8.6 8.8   CAION  --   --  1.19  --    PHOS  --   --  2.3*  --    TROPHS 6 7  --   --      Recent Labs     06/11/20 1745 06/12/20 0449 06/13/20  0735   PROT 7.3 6.7 7.1   LABALBU 3.7 3.5 3.6   * 99* 48*   * 178* 123*   ALKPHOS 279* 256* 222*   BILITOT 4.68* 3.62* 1.10   AMYLASE  --  31  --    LIPASE 23 22  --    TRIG  --  112  --      ABG:No results found for: POCPH, PHART, PH, POCPCO2, MIH4CTB, PCO2, POCPO2, PO2ART, PO2, POCHCO3, MSW9JRB, HCO3, NBEA, PBEA, BEART, BE, THGBART, THB, RZD1XYS, ETOA7ISE, K3JCVWSO, O2SAT, FIO2  No results found for: SPECIAL  No results found for: CULTURE    Radiology:  Us Gallbladder Ruq    Result Date: 6/11/2020  Limited study due to body habitus and overlying bowel gas. Diffuse hepatic steatosis. Cholelithiasis without sonographic evidence of acute cholecystitis. There is dilation of the common bile duct. No obvious stone is noted however correlation with patient's liver function test as well as bilirubin levels recommended. MRCP can be obtained as clinically indicated.        Physical Examination:        General appearance:  alert, cooperative and no distress  Mental Status:  oriented to person, place and time and normal affect  Lungs:  clear to auscultation bilaterally, normal effort  Heart:  regular rate and rhythm, no murmur  Abdomen:  soft, nontender, nondistended, normal bowel sounds, no masses, hepatomegaly, splenomegaly  Extremities:  no edema, redness, tenderness in the calves  Skin:  no gross lesions, rashes, induration    Assessment:        Hospital Problems           Last Modified POA    Right upper quadrant pain 6/10/2020 Yes    Choledocholithiasis 2/82/6441 Yes    Umbilical hernia 8/80/9096 Yes    Essential hypertension 6/11/2020 Yes    Morbid obesity 6/12/2020 Yes    Hyperbilirubinemia 6/12/2020 Yes          Plan:        1. Status post ERCP with removal of 2 stones, bilirubin is improving   2. continue Zosyn  3. Stop IV fluids as patient is eating and drinking okay  4. Start Norvasc for blood pressure control,  5.  continue to use hydralazine as needed  6. Plan for cholecystectomy and umbilical hernia repair on Monday  7. Advised to monitor blood pressure at home and watch for any low or high blood pressure  8.  Lovenox for DVT prophylaxis    Gordon Shields MD  6/13/2020  2:09 PM

## 2020-06-13 NOTE — PROGRESS NOTES
THE [facility] Progress Note    Namita Henley is a 53 y.o. male patient. Hospitalization Day:2      Chief consult reason: Possible choledocholithiasis      Subjective: Patient seen and examined. Patient overall doing well. No abdominal pain. Patient does report neck discomfort s/p ERCP as well as mild esophageal discomfort. Able to tolerate breakfast without issues. VITALS:  BP (!) 155/86   Pulse 72   Temp 98.2 °F (36.8 °C) (Oral)   Resp 20   Ht 5' 11\" (1.803 m)   Wt (!) 343 lb (155.6 kg)   SpO2 97%   BMI 47.84 kg/m²   TEMPERATURE:  Current - Temp: 98.2 °F (36.8 °C); Max - Temp  Av.9 °F (35.5 °C)  Min: 93.5 °F (34.2 °C)  Max: 98.5 °F (36.9 °C)    Physical Assessment:  General appearance:  alert, cooperative and no distress  Mental Status:  oriented to person, place and time and normal affect  Lungs:  clear to auscultation bilaterally, normal effort  Heart:  regular rate and rhythm, no murmur  Abdomen:  soft, MO, nontender, nondistended, normal bowel sounds, + umbilical hernia  Extremities:  no edema, redness, tenderness in the calves  Skin:  warm, dry, no gross lesions or rashes    Data Review:  LABS and IMAGING:     CBC  Recent Labs     20  0735   WBC 7.8 6.6 6.4   HGB 14.8 13.9 14.3   HCT 45.2 43.4 43.9   MCV 95.2 96.2 95.9   MCHC 32.7 32.0 32.6   RDW 13.4 13.6 13.6    196 242       Immature PLTs   No results found for: PLTFLUORE    ANEMIA STUDIES  No results for input(s): LABIRON, TIBC, IRON, FERRITIN, VENKLCVS38, FOLATE, OCCULTBLD in the last 72 hours.     BMP  Recent Labs     209 20  0735    140 133*   K 3.9 4.3 4.2    105 101   CO2 18* 20 23   BUN 10 9 11   CREATININE 0.83 0.75 0.95   GLUCOSE 98 133* 127*   CALCIUM 8.9 8.6 8.8       LFTS  Recent Labs     20  1745 20  0449 20  0735   ALKPHOS 279* 256* 222*   * 178* 123*   * 99* 48*   BILITOT 4.68* 3.62* 1.10 LABALBU 3.7 3.5 3.6       AMYLASE/LIPASE/AMMONIA  Recent Labs     06/11/20  1745 06/12/20  0449   AMYLASE  --  31   LIPASE 23 22       Acute Hepatitis Panel   No results found for: HEPBSAG, HEPCAB, HEPBIGM, HEPAIGM    HCV Genotype   No results found for: HEPATITISCGENOTYPE    HCV Quantitative   No results found for: HCVQNT    LIVER WORK UP:    AFP  No results found for: AFP    Alpha 1 antitrypsin   No results found for: A1A    Anti - Liver/Kidney Ab  No results found for: LIVER-KIDNEYMICROSOMALAB    STEW  No results found for: STEW    AMA  No results found for: MITOAB    ASMA  No results found for: SMOOTHMUSCAB    Ceruloplasmin  No results found for: CERULOPLSM    Celiac panel  No results found for: TISSTRNTIIGG, TTGIGA, IGA    IgG  No results found for: IGG    IgM  No results found for: IGM    GGT   No results found for: LABGGT    PT/INR  Recent Labs     06/12/20  0449   PROTIME 10.3   INR 1.0       Cancer Markers:  CEA:  No results for input(s): CEA in the last 72 hours. Ca 125:  No results for input(s):  in the last 72 hours. Ca 19-9:   No results for input(s):  in the last 72 hours. AFP: No results for input(s): AFP in the last 72 hours. Lactic acid:No results for input(s): LACTACIDWB in the last 72 hours. Radiology Review: Fl Ercp Biliary S&i    Result Date: 6/12/2020  EXAMINATION: 4 SPOT IMAGES FROM AN ERCP COMPARISON: 06/11/2020 FLUOROSCOPY DOSE OR TIME/IMAGES: Time: 11.5 seconds Dose: 5.09 mGy, ka, r Exposures/Images: 4 HISTORY: ORDERING SYSTEM PROVIDED HISTORY: intra op TECHNOLOGIST PROVIDED HISTORY: intra op Reason for Exam: intra op images dilated duct. Alkaline phosphatase elevation and bilirubin suggesting tract obstruction FINDINGS: Endoscopy and cannulation of the major papilla was performed by the gastroenterology service under the supervision of Paulette Trevino. Spot views are presented for interpretation.  Dilated duct with multiple filling defects in the distal common duct

## 2020-06-13 NOTE — PROGRESS NOTES
General Surgery:  Daily Progress Note          PATIENT NAME: Cassy Wiggins     TODAY'S DATE: 6/13/2020, 7:45 AM  CC:  RUQ abdominal pain     SUBJECTIVE:     Pt seen and examined at bedside. No acute events overnight. Afebrile, HD stable. POD#1 s/p ERCP with removal of two 8mm stones. No abdominal pain this AM. Denies nausea, emesis. Tolerating low fat diet. Admits to some throat soreness s/p anesthesia. OBJECTIVE:   VITALS:  BP (!) 148/85   Pulse 90   Temp 98.5 °F (36.9 °C) (Oral)   Resp 22   Ht 5' 11\" (1.803 m)   Wt (!) 343 lb (155.6 kg)   SpO2 99%   BMI 47.84 kg/m²      INTAKE/OUTPUT:      Intake/Output Summary (Last 24 hours) at 6/13/2020 0745  Last data filed at 6/13/2020 0526  Gross per 24 hour   Intake 1200 ml   Output --   Net 1200 ml       PHYSICAL EXAM:  General Appearance:  awake, alert, oriented, in no acute distress  HEENT:  Normocephalic, atraumatic, mucus membranes moist   Skin:  Skin color, texture, turgor normal. No rashes or lesions. Lungs:  Effort normal, no respiratory distress, no accessory muscle use   Heart:  Regular rate and rhythm   Abdomen: Soft, ND, NTTP, no guarding or peritoneal signs, nontender umbilical hernia present   Extremities: Extremities warm to touch, pink, with no edema.       Data:  CBC:   Lab Results   Component Value Date    WBC 6.6 06/12/2020    RBC 4.51 06/12/2020    HGB 13.9 06/12/2020    HCT 43.4 06/12/2020    MCV 96.2 06/12/2020    MCH 30.8 06/12/2020    MCHC 32.0 06/12/2020    RDW 13.6 06/12/2020     06/12/2020    MPV 9.7 06/12/2020     CMP:    Lab Results   Component Value Date     06/12/2020    K 4.3 06/12/2020     06/12/2020    CO2 20 06/12/2020    BUN 9 06/12/2020    CREATININE 0.75 06/12/2020    GFRAA >60 06/12/2020    LABGLOM >60 06/12/2020    GLUCOSE 133 06/12/2020    PROT 6.7 06/12/2020    LABALBU 3.5 06/12/2020    CALCIUM 8.6 06/12/2020    BILITOT 3.62 06/12/2020    ALKPHOS 256 06/12/2020    AST 99 06/12/2020     06/12/2020       Radiology Review:    RUQ US  Limited study due to body habitus and overlying bowel gas.       Diffuse hepatic steatosis.       Cholelithiasis without sonographic evidence of acute cholecystitis.       There is dilation of the common bile duct.  No obvious stone is noted however   correlation with patient's liver function test as well as bilirubin levels   recommended.  MRCP can be obtained as clinically indicated. ASSESSMENT:  Active Hospital Problems    Diagnosis Date Noted    Hyperbilirubinemia [E80.6] 06/12/2020    Choledocholithiasis [K80.50] 21/98/6339    Umbilical hernia [I91.0] 06/11/2020    Essential hypertension [I10] 06/11/2020    Morbid obesity [E66.09] 06/11/2020    Right upper quadrant pain [R10.11] 06/10/2020       53 y.o. male with RUQ abdominal pain-cholelithiasis, choledocholithiasis  POD#1 s/p ERCP with removal of two 8mm stones     Plan:  1. Continue medical mgmt and supportive care per primary  2. Diet: low fat   3. Follow up AM labs   4. GI following - POD#1 s/p ERCP  5. Plan for robotic laparoscopic assisted cholecystectomy, umbilical hernia repair Monday 6/15. Surgery including risks/benefits explained and all questions addressed. Consent obtained. 6. Serial abdominal exams       Nicole Thakkar DO  General Surgery PGY-1     I have reviewed the resident's note and I either performed the key elements of the medical history and physical exam or was present with the resident when the key elements of the medical history and physical exam were performed. I have discussed the findings, established the care plan and recommendations with Resident.     Electronically signed by Jose Antonio Mcfadden DO on 6/16/20 at 8:24 AM EDT

## 2020-06-14 ENCOUNTER — ANESTHESIA EVENT (OUTPATIENT)
Dept: OPERATING ROOM | Age: 55
DRG: 263 | End: 2020-06-14
Payer: MEDICAID

## 2020-06-14 PROCEDURE — 6360000002 HC RX W HCPCS: Performed by: INTERNAL MEDICINE

## 2020-06-14 PROCEDURE — 6360000002 HC RX W HCPCS: Performed by: NURSE PRACTITIONER

## 2020-06-14 PROCEDURE — 2580000003 HC RX 258: Performed by: INTERNAL MEDICINE

## 2020-06-14 PROCEDURE — 6370000000 HC RX 637 (ALT 250 FOR IP): Performed by: INTERNAL MEDICINE

## 2020-06-14 PROCEDURE — 1200000000 HC SEMI PRIVATE

## 2020-06-14 PROCEDURE — 2500000003 HC RX 250 WO HCPCS: Performed by: INTERNAL MEDICINE

## 2020-06-14 PROCEDURE — 99232 SBSQ HOSP IP/OBS MODERATE 35: CPT | Performed by: INTERNAL MEDICINE

## 2020-06-14 RX ORDER — HYDRALAZINE HYDROCHLORIDE 20 MG/ML
10 INJECTION INTRAMUSCULAR; INTRAVENOUS ONCE
Status: COMPLETED | OUTPATIENT
Start: 2020-06-14 | End: 2020-06-14

## 2020-06-14 RX ORDER — AMLODIPINE BESYLATE 10 MG/1
10 TABLET ORAL DAILY
Status: DISCONTINUED | OUTPATIENT
Start: 2020-06-14 | End: 2020-06-16 | Stop reason: HOSPADM

## 2020-06-14 RX ORDER — LISINOPRIL 5 MG/1
5 TABLET ORAL DAILY
Status: DISCONTINUED | OUTPATIENT
Start: 2020-06-14 | End: 2020-06-15

## 2020-06-14 RX ADMIN — ENOXAPARIN SODIUM 40 MG: 40 INJECTION SUBCUTANEOUS at 08:50

## 2020-06-14 RX ADMIN — FAMOTIDINE 20 MG: 10 INJECTION, SOLUTION INTRAVENOUS at 08:50

## 2020-06-14 RX ADMIN — HYDRALAZINE HYDROCHLORIDE 10 MG: 20 INJECTION INTRAMUSCULAR; INTRAVENOUS at 22:54

## 2020-06-14 RX ADMIN — LISINOPRIL 5 MG: 5 TABLET ORAL at 12:18

## 2020-06-14 RX ADMIN — FAMOTIDINE 20 MG: 10 INJECTION, SOLUTION INTRAVENOUS at 20:29

## 2020-06-14 RX ADMIN — PIPERACILLIN AND TAZOBACTAM 3.38 G: 3; .375 INJECTION, POWDER, LYOPHILIZED, FOR SOLUTION INTRAVENOUS at 08:50

## 2020-06-14 RX ADMIN — HYDRALAZINE HYDROCHLORIDE 10 MG: 20 INJECTION INTRAMUSCULAR; INTRAVENOUS at 20:40

## 2020-06-14 RX ADMIN — Medication 10 ML: at 08:51

## 2020-06-14 RX ADMIN — ENOXAPARIN SODIUM 40 MG: 40 INJECTION SUBCUTANEOUS at 20:29

## 2020-06-14 RX ADMIN — AMLODIPINE BESYLATE 10 MG: 10 TABLET ORAL at 12:18

## 2020-06-14 RX ADMIN — PIPERACILLIN AND TAZOBACTAM 3.38 G: 3; .375 INJECTION, POWDER, LYOPHILIZED, FOR SOLUTION INTRAVENOUS at 16:50

## 2020-06-14 RX ADMIN — PIPERACILLIN AND TAZOBACTAM 3.38 G: 3; .375 INJECTION, POWDER, LYOPHILIZED, FOR SOLUTION INTRAVENOUS at 01:14

## 2020-06-14 NOTE — FLOWSHEET NOTE
Assessment  Patient is a 53year old male. Patient said that he has received much better care and support in the past 2 days than he had in 4 and 1/2 months at another [facility] hospital.  Patient says that the nurses and doctors here are top notch and they know how to care  For a patient and to determine what is wrong with him. Patient says that he would encourage any one who needs hospitalization or surgery to come to [facility] [place] [facility]. V's. Patient is to have surgery on Monday. Patient has a wife at home (Ashley) and also he considers his faterh Daan Stoner to be a great support to him. Intervention   provided a ministry of presence and active listening to the patient.  also probed the patient's feelings, concerns and support systems.  prayed with the patient. Outcome  Patient was grateful to the  and said that it brought him much comfort.         06/13/20 2141   Encounter Summary   Services provided to: Patient   Referral/Consult From: 1 Shiva Gomez   (6/13/2020)   Complexity of Encounter Moderate   Length of Encounter 30 minutes   Spiritual Assessment Completed Yes   Spiritual/Advent   Type Spiritual support   Assessment Calm; Approachable; Hopeful;Peaceful   Intervention Active listening;Explored feelings, thoughts, concerns;Explored coping resources;Prayer;Sustaining presence/ Ministry of presence   Outcome Comfort;Expressed gratitude

## 2020-06-14 NOTE — PLAN OF CARE
Problem: Activity:  Goal: Risk for activity intolerance will decrease  Description: Risk for activity intolerance will decrease  6/14/2020 1839 by Leny Landeros RN  Outcome: Ongoing  6/14/2020 0554 by Dio Wiseman RN  Outcome: Ongoing     Problem:  Bowel/Gastric:  Goal: Bowel function will improve  Description: Bowel function will improve  6/14/2020 1839 by Leny Landeros RN  Outcome: Ongoing  6/14/2020 0554 by Dio Wiseman RN  Outcome: Ongoing  Goal: Diagnostic test results will improve  Description: Diagnostic test results will improve  6/14/2020 1839 by Leny Landeros RN  Outcome: Ongoing  6/14/2020 0554 by Dio Wiseman RN  Outcome: Ongoing  Goal: Occurrences of nausea will decrease  Description: Occurrences of nausea will decrease  6/14/2020 1839 by Leny Landeros RN  Outcome: Ongoing  6/14/2020 0554 by Dio Wiseman RN  Outcome: Ongoing  Goal: Occurrences of vomiting will decrease  Description: Occurrences of vomiting will decrease  6/14/2020 1839 by Leny Landeros RN  Outcome: Ongoing  6/14/2020 0554 by Dio Wiseman RN  Outcome: Ongoing     Problem: Cardiac:  Goal: Ability to maintain an adequate cardiac output will improve  Description: Ability to maintain an adequate cardiac output will improve  6/14/2020 1839 by Leny Landeros RN  Outcome: Ongoing  6/14/2020 0554 by Dio Wiseman RN  Outcome: Ongoing     Problem: Physical Regulation:  Goal: Complications related to the disease process, condition or treatment will be avoided or minimized  Description: Complications related to the disease process, condition or treatment will be avoided or minimized  6/14/2020 1839 by Leny Landeros RN  Outcome: Ongoing  6/14/2020 0554 by Dio Wiseman RN  Outcome: Ongoing     Problem: Safety:  Goal: Ability to remain free from injury will improve  6/14/2020 1839 by Leny Landeros RN  Outcome: Ongoing  6/14/2020 0554 by Dio Wiseman RN  Outcome: Ongoing

## 2020-06-14 NOTE — PLAN OF CARE
Problem: Activity:  Goal: Risk for activity intolerance will decrease  Description: Risk for activity intolerance will decrease  Outcome: Ongoing     Problem:  Bowel/Gastric:  Goal: Bowel function will improve  Description: Bowel function will improve  Outcome: Ongoing  Goal: Diagnostic test results will improve  Description: Diagnostic test results will improve  Outcome: Ongoing  Goal: Occurrences of nausea will decrease  Description: Occurrences of nausea will decrease  Outcome: Ongoing  Goal: Occurrences of vomiting will decrease  Description: Occurrences of vomiting will decrease  Outcome: Ongoing     Problem: Cardiac:  Goal: Ability to maintain an adequate cardiac output will improve  Description: Ability to maintain an adequate cardiac output will improve  Outcome: Ongoing     Problem: Physical Regulation:  Goal: Complications related to the disease process, condition or treatment will be avoided or minimized  Description: Complications related to the disease process, condition or treatment will be avoided or minimized  Outcome: Ongoing     Problem: Safety:  Goal: Ability to remain free from injury will improve  Outcome: Ongoing

## 2020-06-14 NOTE — PROGRESS NOTES
General Surgery:  Daily Progress Note          PATIENT NAME: Cassy Wiggins     TODAY'S DATE: 6/14/2020, 9:00 AM  CC:  RUQ abdominal pain     SUBJECTIVE:     Pt seen and examined at bedside. No acute events overnight. Afebrile. HTN overnight. POD#2 s/p ERCP with removal of two 8mm stones. TBili yesterday 1.10 (3.62). No abdominal pain this AM. Denies nausea, emesis. Tolerating low fat diet. OBJECTIVE:   VITALS:  BP (!) 177/98   Pulse 70   Temp 98.1 °F (36.7 °C) (Oral)   Resp 18   Ht 5' 11\" (1.803 m)   Wt (!) 343 lb (155.6 kg)   SpO2 96%   BMI 47.84 kg/m²      INTAKE/OUTPUT:    No intake or output data in the 24 hours ending 06/14/20 0900    PHYSICAL EXAM:  General Appearance:  awake, alert, oriented, in no acute distress  HEENT:  Normocephalic, atraumatic, mucus membranes moist   Skin:  Skin color, texture, turgor normal. No rashes or lesions. Lungs:  Effort normal, no respiratory distress, no accessory muscle use   Heart:  Regular rate and rhythm   Abdomen: Soft, ND, NTTP, no guarding or peritoneal signs, nontender umbilical hernia present   Extremities: Extremities warm to touch, pink, with no edema.       Data:  CBC:   Lab Results   Component Value Date    WBC 6.4 06/13/2020    RBC 4.58 06/13/2020    HGB 14.3 06/13/2020    HCT 43.9 06/13/2020    MCV 95.9 06/13/2020    MCH 31.2 06/13/2020    MCHC 32.6 06/13/2020    RDW 13.6 06/13/2020     06/13/2020    MPV 9.6 06/13/2020     CMP:    Lab Results   Component Value Date     06/13/2020    K 4.2 06/13/2020     06/13/2020    CO2 23 06/13/2020    BUN 11 06/13/2020    CREATININE 0.95 06/13/2020    GFRAA >60 06/13/2020    LABGLOM >60 06/13/2020    GLUCOSE 127 06/13/2020    PROT 7.1 06/13/2020    LABALBU 3.6 06/13/2020    CALCIUM 8.8 06/13/2020    BILITOT 1.10 06/13/2020    ALKPHOS 222 06/13/2020    AST 48 06/13/2020     06/13/2020       Radiology Review:    RUQ US  Limited study due to body habitus and overlying bowel gas.     Diffuse hepatic steatosis.       Cholelithiasis without sonographic evidence of acute cholecystitis.       There is dilation of the common bile duct.  No obvious stone is noted however   correlation with patient's liver function test as well as bilirubin levels   recommended.  MRCP can be obtained as clinically indicated. ASSESSMENT:  Active Hospital Problems    Diagnosis Date Noted    Hyperbilirubinemia [E80.6] 06/12/2020    Choledocholithiasis [K80.50] 60/63/2671    Umbilical hernia [P22.6] 06/11/2020    Essential hypertension [I10] 06/11/2020    Morbid obesity [E66.09] 06/11/2020    Right upper quadrant pain [R10.11] 06/10/2020       53 y.o. male with RUQ abdominal pain-cholelithiasis, choledocholithiasis  POD#2 s/p ERCP with removal of two 8mm stones     Plan:  1. Continue medical mgmt and supportive care per primary  2. Diet: low fat diet. NPO at [place] for surgery 6/15   3. Follow up AM labs   4. GI following - POD#2 s/p ERCP. Tbili, AST/ALT, alk phos downtrending   5. Plan for robotic laparoscopic assisted cholecystectomy, umbilical hernia repair Monday 6/15. Surgery including risks/benefits explained and all questions addressed. Consent obtained. 6. Serial abdominal exams       Nicole Thakkar DO  General Surgery PGY-1     I have reviewed the resident's note and I either performed the key elements of the medical history and physical exam or was present with the resident when the key elements of the medical history and physical exam were performed. I have discussed the findings, established the care plan and recommendations with Resident.     Electronically signed by Shaista Nathan DO on 6/16/20 at 8:24 AM EDT

## 2020-06-15 ENCOUNTER — ANESTHESIA (OUTPATIENT)
Dept: OPERATING ROOM | Age: 55
DRG: 263 | End: 2020-06-15
Payer: MEDICAID

## 2020-06-15 VITALS — OXYGEN SATURATION: 95 % | SYSTOLIC BLOOD PRESSURE: 110 MMHG | DIASTOLIC BLOOD PRESSURE: 55 MMHG

## 2020-06-15 LAB
ABSOLUTE EOS #: 0.28 K/UL (ref 0–0.44)
ABSOLUTE IMMATURE GRANULOCYTE: <0.03 K/UL (ref 0–0.3)
ABSOLUTE LYMPH #: 1.48 K/UL (ref 1.1–3.7)
ABSOLUTE MONO #: 0.49 K/UL (ref 0.1–1.2)
ALBUMIN SERPL-MCNC: 3.4 G/DL (ref 3.5–5.2)
ALBUMIN/GLOBULIN RATIO: 1 (ref 1–2.5)
ALP BLD-CCNC: 175 U/L (ref 40–129)
ALT SERPL-CCNC: 78 U/L (ref 5–41)
ANION GAP SERPL CALCULATED.3IONS-SCNC: 14 MMOL/L (ref 9–17)
AST SERPL-CCNC: 39 U/L
BASOPHILS # BLD: 1 % (ref 0–2)
BASOPHILS ABSOLUTE: 0.03 K/UL (ref 0–0.2)
BILIRUB SERPL-MCNC: 0.76 MG/DL (ref 0.3–1.2)
BILIRUBIN DIRECT: 0.34 MG/DL
BILIRUBIN, INDIRECT: 0.42 MG/DL (ref 0–1)
BUN BLDV-MCNC: 12 MG/DL (ref 6–20)
CALCIUM SERPL-MCNC: 8.8 MG/DL (ref 8.6–10.4)
CHLORIDE BLD-SCNC: 102 MMOL/L (ref 98–107)
CO2: 21 MMOL/L (ref 20–31)
CREAT SERPL-MCNC: 0.82 MG/DL (ref 0.7–1.2)
DIFFERENTIAL TYPE: ABNORMAL
EOSINOPHILS RELATIVE PERCENT: 5 % (ref 1–4)
GFR AFRICAN AMERICAN: >60 ML/MIN
GFR NON-AFRICAN AMERICAN: >60 ML/MIN
GFR SERPL CREATININE-BSD FRML MDRD: ABNORMAL ML/MIN/{1.73_M2}
GFR SERPL CREATININE-BSD FRML MDRD: ABNORMAL ML/MIN/{1.73_M2}
GLUCOSE BLD-MCNC: 99 MG/DL (ref 70–99)
HCT VFR BLD CALC: 45.7 % (ref 40.7–50.3)
HEMOGLOBIN: 14.5 G/DL (ref 13–17)
IMMATURE GRANULOCYTES: 0 %
LYMPHOCYTES # BLD: 24 % (ref 24–43)
MCH RBC QN AUTO: 30.7 PG (ref 25.2–33.5)
MCHC RBC AUTO-ENTMCNC: 31.7 G/DL (ref 28.4–34.8)
MCV RBC AUTO: 96.8 FL (ref 82.6–102.9)
MONOCYTES # BLD: 8 % (ref 3–12)
NRBC AUTOMATED: 0 PER 100 WBC
PDW BLD-RTO: 13.2 % (ref 11.8–14.4)
PLATELET # BLD: 230 K/UL (ref 138–453)
PLATELET ESTIMATE: ABNORMAL
PMV BLD AUTO: 9 FL (ref 8.1–13.5)
POTASSIUM SERPL-SCNC: 3.9 MMOL/L (ref 3.7–5.3)
RBC # BLD: 4.72 M/UL (ref 4.21–5.77)
RBC # BLD: ABNORMAL 10*6/UL
SEG NEUTROPHILS: 62 % (ref 36–65)
SEGMENTED NEUTROPHILS ABSOLUTE COUNT: 3.77 K/UL (ref 1.5–8.1)
SODIUM BLD-SCNC: 137 MMOL/L (ref 135–144)
TOTAL PROTEIN: 6.9 G/DL (ref 6.4–8.3)
WBC # BLD: 6.1 K/UL (ref 3.5–11.3)
WBC # BLD: ABNORMAL 10*3/UL

## 2020-06-15 PROCEDURE — 2709999900 HC NON-CHARGEABLE SUPPLY: Performed by: SURGERY

## 2020-06-15 PROCEDURE — 6360000002 HC RX W HCPCS: Performed by: STUDENT IN AN ORGANIZED HEALTH CARE EDUCATION/TRAINING PROGRAM

## 2020-06-15 PROCEDURE — 0FT44ZZ RESECTION OF GALLBLADDER, PERCUTANEOUS ENDOSCOPIC APPROACH: ICD-10-PCS | Performed by: SURGERY

## 2020-06-15 PROCEDURE — 2500000003 HC RX 250 WO HCPCS: Performed by: NURSE ANESTHETIST, CERTIFIED REGISTERED

## 2020-06-15 PROCEDURE — 8E0W4CZ ROBOTIC ASSISTED PROCEDURE OF TRUNK REGION, PERCUTANEOUS ENDOSCOPIC APPROACH: ICD-10-PCS | Performed by: SURGERY

## 2020-06-15 PROCEDURE — 2580000003 HC RX 258: Performed by: INTERNAL MEDICINE

## 2020-06-15 PROCEDURE — 36415 COLL VENOUS BLD VENIPUNCTURE: CPT

## 2020-06-15 PROCEDURE — 2580000003 HC RX 258: Performed by: SURGERY

## 2020-06-15 PROCEDURE — 7100000000 HC PACU RECOVERY - FIRST 15 MIN: Performed by: SURGERY

## 2020-06-15 PROCEDURE — 49653 PR LAP, VENTRAL HERNIA REPAIR,INCARCERATED: CPT | Performed by: SURGERY

## 2020-06-15 PROCEDURE — 3700000001 HC ADD 15 MINUTES (ANESTHESIA): Performed by: SURGERY

## 2020-06-15 PROCEDURE — 6360000002 HC RX W HCPCS: Performed by: ANESTHESIOLOGY

## 2020-06-15 PROCEDURE — S2900 ROBOTIC SURGICAL SYSTEM: HCPCS | Performed by: SURGERY

## 2020-06-15 PROCEDURE — 80053 COMPREHEN METABOLIC PANEL: CPT

## 2020-06-15 PROCEDURE — 2500000003 HC RX 250 WO HCPCS: Performed by: INTERNAL MEDICINE

## 2020-06-15 PROCEDURE — 64488 TAP BLOCK BI INJECTION: CPT | Performed by: ANESTHESIOLOGY

## 2020-06-15 PROCEDURE — 1200000000 HC SEMI PRIVATE

## 2020-06-15 PROCEDURE — 7100000001 HC PACU RECOVERY - ADDTL 15 MIN: Performed by: SURGERY

## 2020-06-15 PROCEDURE — 47562 LAPAROSCOPIC CHOLECYSTECTOMY: CPT | Performed by: SURGERY

## 2020-06-15 PROCEDURE — 6370000000 HC RX 637 (ALT 250 FOR IP): Performed by: INTERNAL MEDICINE

## 2020-06-15 PROCEDURE — 99232 SBSQ HOSP IP/OBS MODERATE 35: CPT | Performed by: INTERNAL MEDICINE

## 2020-06-15 PROCEDURE — 2720000010 HC SURG SUPPLY STERILE: Performed by: SURGERY

## 2020-06-15 PROCEDURE — 6360000002 HC RX W HCPCS: Performed by: NURSE ANESTHETIST, CERTIFIED REGISTERED

## 2020-06-15 PROCEDURE — 3600000019 HC SURGERY ROBOT ADDTL 15MIN: Performed by: SURGERY

## 2020-06-15 PROCEDURE — 6370000000 HC RX 637 (ALT 250 FOR IP): Performed by: STUDENT IN AN ORGANIZED HEALTH CARE EDUCATION/TRAINING PROGRAM

## 2020-06-15 PROCEDURE — C1760 CLOSURE DEV, VASC: HCPCS | Performed by: SURGERY

## 2020-06-15 PROCEDURE — 6360000002 HC RX W HCPCS: Performed by: INTERNAL MEDICINE

## 2020-06-15 PROCEDURE — 3700000000 HC ANESTHESIA ATTENDED CARE: Performed by: SURGERY

## 2020-06-15 PROCEDURE — 2580000003 HC RX 258: Performed by: STUDENT IN AN ORGANIZED HEALTH CARE EDUCATION/TRAINING PROGRAM

## 2020-06-15 PROCEDURE — 88304 TISSUE EXAM BY PATHOLOGIST: CPT

## 2020-06-15 PROCEDURE — 2580000003 HC RX 258: Performed by: NURSE ANESTHETIST, CERTIFIED REGISTERED

## 2020-06-15 PROCEDURE — 82248 BILIRUBIN DIRECT: CPT

## 2020-06-15 PROCEDURE — 2500000003 HC RX 250 WO HCPCS: Performed by: ANESTHESIOLOGY

## 2020-06-15 PROCEDURE — 6370000000 HC RX 637 (ALT 250 FOR IP): Performed by: SURGERY

## 2020-06-15 PROCEDURE — 3600000009 HC SURGERY ROBOT BASE: Performed by: SURGERY

## 2020-06-15 PROCEDURE — 85025 COMPLETE CBC W/AUTO DIFF WBC: CPT

## 2020-06-15 PROCEDURE — 0WQF4ZZ REPAIR ABDOMINAL WALL, PERCUTANEOUS ENDOSCOPIC APPROACH: ICD-10-PCS | Performed by: SURGERY

## 2020-06-15 PROCEDURE — 2500000003 HC RX 250 WO HCPCS: Performed by: STUDENT IN AN ORGANIZED HEALTH CARE EDUCATION/TRAINING PROGRAM

## 2020-06-15 PROCEDURE — L3650 SO 8 ABD RESTRAINT PRE OTS: HCPCS | Performed by: SURGERY

## 2020-06-15 PROCEDURE — 2580000003 HC RX 258: Performed by: ANESTHESIOLOGY

## 2020-06-15 RX ORDER — SODIUM CHLORIDE 0.9 % (FLUSH) 0.9 %
10 SYRINGE (ML) INJECTION EVERY 12 HOURS SCHEDULED
Status: DISCONTINUED | OUTPATIENT
Start: 2020-06-15 | End: 2020-06-15 | Stop reason: HOSPADM

## 2020-06-15 RX ORDER — DOCUSATE SODIUM 100 MG/1
100 CAPSULE, LIQUID FILLED ORAL 2 TIMES DAILY PRN
Qty: 60 CAPSULE | Refills: 0 | Status: SHIPPED | OUTPATIENT
Start: 2020-06-15 | End: 2020-07-15

## 2020-06-15 RX ORDER — SODIUM CHLORIDE, SODIUM LACTATE, POTASSIUM CHLORIDE, CALCIUM CHLORIDE 600; 310; 30; 20 MG/100ML; MG/100ML; MG/100ML; MG/100ML
INJECTION, SOLUTION INTRAVENOUS CONTINUOUS
Status: DISCONTINUED | OUTPATIENT
Start: 2020-06-15 | End: 2020-06-15

## 2020-06-15 RX ORDER — MIDAZOLAM HYDROCHLORIDE 1 MG/ML
1 INJECTION INTRAMUSCULAR; INTRAVENOUS EVERY 10 MIN PRN
Status: DISCONTINUED | OUTPATIENT
Start: 2020-06-15 | End: 2020-06-15 | Stop reason: HOSPADM

## 2020-06-15 RX ORDER — DOCUSATE SODIUM 100 MG/1
100 CAPSULE, LIQUID FILLED ORAL 2 TIMES DAILY PRN
Qty: 60 CAPSULE | Refills: 0 | Status: SHIPPED | OUTPATIENT
Start: 2020-06-15 | End: 2020-06-15 | Stop reason: SDUPTHER

## 2020-06-15 RX ORDER — ONDANSETRON 2 MG/ML
INJECTION INTRAMUSCULAR; INTRAVENOUS PRN
Status: DISCONTINUED | OUTPATIENT
Start: 2020-06-15 | End: 2020-06-15 | Stop reason: SDUPTHER

## 2020-06-15 RX ORDER — ROCURONIUM BROMIDE 10 MG/ML
INJECTION, SOLUTION INTRAVENOUS PRN
Status: DISCONTINUED | OUTPATIENT
Start: 2020-06-15 | End: 2020-06-15 | Stop reason: SDUPTHER

## 2020-06-15 RX ORDER — LIDOCAINE HYDROCHLORIDE 10 MG/ML
1 INJECTION, SOLUTION EPIDURAL; INFILTRATION; INTRACAUDAL; PERINEURAL
Status: DISCONTINUED | OUTPATIENT
Start: 2020-06-15 | End: 2020-06-15 | Stop reason: HOSPADM

## 2020-06-15 RX ORDER — MAGNESIUM HYDROXIDE 1200 MG/15ML
LIQUID ORAL CONTINUOUS PRN
Status: COMPLETED | OUTPATIENT
Start: 2020-06-15 | End: 2020-06-15

## 2020-06-15 RX ORDER — GINSENG 100 MG
CAPSULE ORAL PRN
Status: DISCONTINUED | OUTPATIENT
Start: 2020-06-15 | End: 2020-06-15 | Stop reason: ALTCHOICE

## 2020-06-15 RX ORDER — M-VIT,TX,IRON,MINS/CALC/FOLIC 27MG-0.4MG
1 TABLET ORAL DAILY
Status: ON HOLD | COMMUNITY
End: 2020-06-16 | Stop reason: HOSPADM

## 2020-06-15 RX ORDER — OXYCODONE HYDROCHLORIDE 5 MG/1
5 TABLET ORAL EVERY 4 HOURS PRN
Status: DISCONTINUED | OUTPATIENT
Start: 2020-06-15 | End: 2020-06-16 | Stop reason: HOSPADM

## 2020-06-15 RX ORDER — FENTANYL CITRATE 50 UG/ML
25 INJECTION, SOLUTION INTRAMUSCULAR; INTRAVENOUS EVERY 5 MIN PRN
Status: DISCONTINUED | OUTPATIENT
Start: 2020-06-15 | End: 2020-06-15 | Stop reason: HOSPADM

## 2020-06-15 RX ORDER — FENTANYL CITRATE 50 UG/ML
50 INJECTION, SOLUTION INTRAMUSCULAR; INTRAVENOUS EVERY 5 MIN PRN
Status: COMPLETED | OUTPATIENT
Start: 2020-06-15 | End: 2020-06-15

## 2020-06-15 RX ORDER — PHENYLEPHRINE HCL IN 0.9% NACL 0.5 MG/5ML
SYRINGE (ML) INTRAVENOUS PRN
Status: DISCONTINUED | OUTPATIENT
Start: 2020-06-15 | End: 2020-06-15 | Stop reason: SDUPTHER

## 2020-06-15 RX ORDER — GLYCOPYRROLATE 1 MG/5 ML
SYRINGE (ML) INTRAVENOUS PRN
Status: DISCONTINUED | OUTPATIENT
Start: 2020-06-15 | End: 2020-06-15 | Stop reason: SDUPTHER

## 2020-06-15 RX ORDER — LISINOPRIL 10 MG/1
10 TABLET ORAL DAILY
Status: DISCONTINUED | OUTPATIENT
Start: 2020-06-16 | End: 2020-06-16 | Stop reason: HOSPADM

## 2020-06-15 RX ORDER — METHOCARBAMOL 750 MG/1
750 TABLET, FILM COATED ORAL EVERY 6 HOURS
Status: DISCONTINUED | OUTPATIENT
Start: 2020-06-15 | End: 2020-06-16 | Stop reason: HOSPADM

## 2020-06-15 RX ORDER — SODIUM CHLORIDE 0.9 % (FLUSH) 0.9 %
10 SYRINGE (ML) INJECTION PRN
Status: DISCONTINUED | OUTPATIENT
Start: 2020-06-15 | End: 2020-06-15 | Stop reason: HOSPADM

## 2020-06-15 RX ORDER — MEPERIDINE HYDROCHLORIDE 50 MG/ML
12.5 INJECTION INTRAMUSCULAR; INTRAVENOUS; SUBCUTANEOUS EVERY 5 MIN PRN
Status: DISCONTINUED | OUTPATIENT
Start: 2020-06-15 | End: 2020-06-15 | Stop reason: HOSPADM

## 2020-06-15 RX ORDER — BUPIVACAINE HYDROCHLORIDE 5 MG/ML
40 INJECTION, SOLUTION EPIDURAL; INTRACAUDAL ONCE
Status: COMPLETED | OUTPATIENT
Start: 2020-06-15 | End: 2020-06-15

## 2020-06-15 RX ORDER — OXYCODONE HYDROCHLORIDE AND ACETAMINOPHEN 5; 325 MG/1; MG/1
1 TABLET ORAL EVERY 6 HOURS PRN
Qty: 20 TABLET | Refills: 0 | Status: SHIPPED | OUTPATIENT
Start: 2020-06-15 | End: 2020-06-20

## 2020-06-15 RX ORDER — ACETAMINOPHEN 500 MG
1000 TABLET ORAL EVERY 8 HOURS SCHEDULED
Status: DISCONTINUED | OUTPATIENT
Start: 2020-06-15 | End: 2020-06-16 | Stop reason: HOSPADM

## 2020-06-15 RX ORDER — FENTANYL CITRATE 50 UG/ML
INJECTION, SOLUTION INTRAMUSCULAR; INTRAVENOUS PRN
Status: DISCONTINUED | OUTPATIENT
Start: 2020-06-15 | End: 2020-06-15 | Stop reason: SDUPTHER

## 2020-06-15 RX ORDER — LIDOCAINE HYDROCHLORIDE 10 MG/ML
INJECTION, SOLUTION EPIDURAL; INFILTRATION; INTRACAUDAL; PERINEURAL PRN
Status: DISCONTINUED | OUTPATIENT
Start: 2020-06-15 | End: 2020-06-15 | Stop reason: SDUPTHER

## 2020-06-15 RX ORDER — OXYCODONE HYDROCHLORIDE AND ACETAMINOPHEN 5; 325 MG/1; MG/1
1 TABLET ORAL EVERY 6 HOURS PRN
Qty: 20 TABLET | Refills: 0 | Status: SHIPPED | OUTPATIENT
Start: 2020-06-15 | End: 2020-06-15 | Stop reason: SDUPTHER

## 2020-06-15 RX ORDER — INDOCYANINE GREEN AND WATER 25 MG
KIT INJECTION PRN
Status: DISCONTINUED | OUTPATIENT
Start: 2020-06-15 | End: 2020-06-15 | Stop reason: ALTCHOICE

## 2020-06-15 RX ORDER — INDOCYANINE GREEN AND WATER 25 MG
KIT INJECTION PRN
Status: DISCONTINUED | OUTPATIENT
Start: 2020-06-15 | End: 2020-06-15 | Stop reason: SDUPTHER

## 2020-06-15 RX ORDER — BUPIVACAINE HYDROCHLORIDE 5 MG/ML
INJECTION, SOLUTION EPIDURAL; INTRACAUDAL
Status: COMPLETED | OUTPATIENT
Start: 2020-06-15 | End: 2020-06-15

## 2020-06-15 RX ORDER — NEOSTIGMINE METHYLSULFATE 5 MG/5 ML
SYRINGE (ML) INTRAVENOUS PRN
Status: DISCONTINUED | OUTPATIENT
Start: 2020-06-15 | End: 2020-06-15 | Stop reason: SDUPTHER

## 2020-06-15 RX ORDER — KETOROLAC TROMETHAMINE 15 MG/ML
15 INJECTION, SOLUTION INTRAMUSCULAR; INTRAVENOUS EVERY 6 HOURS
Status: DISCONTINUED | OUTPATIENT
Start: 2020-06-15 | End: 2020-06-16 | Stop reason: HOSPADM

## 2020-06-15 RX ORDER — DEXAMETHASONE SODIUM PHOSPHATE 10 MG/ML
INJECTION INTRAMUSCULAR; INTRAVENOUS PRN
Status: DISCONTINUED | OUTPATIENT
Start: 2020-06-15 | End: 2020-06-15 | Stop reason: SDUPTHER

## 2020-06-15 RX ORDER — SODIUM CHLORIDE 9 MG/ML
INJECTION, SOLUTION INTRAVENOUS CONTINUOUS PRN
Status: DISCONTINUED | OUTPATIENT
Start: 2020-06-15 | End: 2020-06-15 | Stop reason: SDUPTHER

## 2020-06-15 RX ORDER — METHOCARBAMOL 500 MG/1
500 TABLET, FILM COATED ORAL EVERY 6 HOURS
Qty: 40 TABLET | Refills: 0 | Status: SHIPPED | OUTPATIENT
Start: 2020-06-15 | End: 2020-06-15 | Stop reason: SDUPTHER

## 2020-06-15 RX ORDER — PROPOFOL 10 MG/ML
INJECTION, EMULSION INTRAVENOUS PRN
Status: DISCONTINUED | OUTPATIENT
Start: 2020-06-15 | End: 2020-06-15 | Stop reason: SDUPTHER

## 2020-06-15 RX ORDER — KETOROLAC TROMETHAMINE 30 MG/ML
INJECTION, SOLUTION INTRAMUSCULAR; INTRAVENOUS PRN
Status: DISCONTINUED | OUTPATIENT
Start: 2020-06-15 | End: 2020-06-15 | Stop reason: SDUPTHER

## 2020-06-15 RX ORDER — METHOCARBAMOL 500 MG/1
500 TABLET, FILM COATED ORAL EVERY 6 HOURS
Qty: 40 TABLET | Refills: 0 | Status: SHIPPED | OUTPATIENT
Start: 2020-06-15 | End: 2020-06-25

## 2020-06-15 RX ADMIN — ROCURONIUM BROMIDE 10 MG: 10 INJECTION INTRAVENOUS at 12:24

## 2020-06-15 RX ADMIN — SODIUM CHLORIDE: 9 INJECTION, SOLUTION INTRAVENOUS at 11:20

## 2020-06-15 RX ADMIN — PIPERACILLIN AND TAZOBACTAM 3.38 G: 3; .375 INJECTION, POWDER, LYOPHILIZED, FOR SOLUTION INTRAVENOUS at 01:47

## 2020-06-15 RX ADMIN — PROPOFOL 50 MG: 10 INJECTION, EMULSION INTRAVENOUS at 12:22

## 2020-06-15 RX ADMIN — Medication 0.8 MG: at 13:44

## 2020-06-15 RX ADMIN — LIDOCAINE HYDROCHLORIDE 50 MG: 10 INJECTION, SOLUTION EPIDURAL; INFILTRATION; INTRACAUDAL; PERINEURAL at 11:32

## 2020-06-15 RX ADMIN — PIPERACILLIN AND TAZOBACTAM 3.38 G: 3; .375 INJECTION, POWDER, LYOPHILIZED, FOR SOLUTION INTRAVENOUS at 08:45

## 2020-06-15 RX ADMIN — PROPOFOL 50 MG: 10 INJECTION, EMULSION INTRAVENOUS at 12:21

## 2020-06-15 RX ADMIN — ACETAMINOPHEN 1000 MG: 500 TABLET ORAL at 21:39

## 2020-06-15 RX ADMIN — METHOCARBAMOL TABLETS 750 MG: 750 TABLET, COATED ORAL at 17:05

## 2020-06-15 RX ADMIN — KETOROLAC TROMETHAMINE 15 MG: 15 INJECTION, SOLUTION INTRAMUSCULAR; INTRAVENOUS at 21:39

## 2020-06-15 RX ADMIN — Medication 100 MCG: at 13:25

## 2020-06-15 RX ADMIN — Medication 100 MCG: at 11:49

## 2020-06-15 RX ADMIN — PROPOFOL 200 MG: 10 INJECTION, EMULSION INTRAVENOUS at 11:32

## 2020-06-15 RX ADMIN — FENTANYL CITRATE 50 MCG: 50 INJECTION INTRAMUSCULAR; INTRAVENOUS at 14:25

## 2020-06-15 RX ADMIN — PROPOFOL 60 MG: 10 INJECTION, EMULSION INTRAVENOUS at 12:50

## 2020-06-15 RX ADMIN — Medication 100 MCG: at 12:33

## 2020-06-15 RX ADMIN — DEXAMETHASONE SODIUM PHOSPHATE 10 MG: 10 INJECTION INTRAMUSCULAR; INTRAVENOUS at 11:39

## 2020-06-15 RX ADMIN — FENTANYL CITRATE 100 MCG: 50 INJECTION INTRAMUSCULAR; INTRAVENOUS at 11:32

## 2020-06-15 RX ADMIN — FENTANYL CITRATE 50 MCG: 50 INJECTION INTRAMUSCULAR; INTRAVENOUS at 14:45

## 2020-06-15 RX ADMIN — FAMOTIDINE 20 MG: 10 INJECTION, SOLUTION INTRAVENOUS at 08:46

## 2020-06-15 RX ADMIN — Medication 40 ML: at 11:16

## 2020-06-15 RX ADMIN — ROCURONIUM BROMIDE 50 MG: 10 INJECTION INTRAVENOUS at 11:32

## 2020-06-15 RX ADMIN — Medication 100 MCG: at 11:53

## 2020-06-15 RX ADMIN — BUPIVACAINE HYDROCHLORIDE 40 ML: 5 INJECTION, SOLUTION EPIDURAL; INTRACAUDAL; PERINEURAL at 11:17

## 2020-06-15 RX ADMIN — Medication 200 MCG: at 12:26

## 2020-06-15 RX ADMIN — FENTANYL CITRATE 50 MCG: 50 INJECTION INTRAMUSCULAR; INTRAVENOUS at 14:17

## 2020-06-15 RX ADMIN — Medication 10 ML: at 08:47

## 2020-06-15 RX ADMIN — SODIUM CHLORIDE, POTASSIUM CHLORIDE, SODIUM LACTATE AND CALCIUM CHLORIDE: 600; 310; 30; 20 INJECTION, SOLUTION INTRAVENOUS at 14:23

## 2020-06-15 RX ADMIN — LISINOPRIL 5 MG: 5 TABLET ORAL at 08:46

## 2020-06-15 RX ADMIN — KETOROLAC TROMETHAMINE 30 MG: 30 INJECTION, SOLUTION INTRAMUSCULAR; INTRAVENOUS at 13:45

## 2020-06-15 RX ADMIN — OXYCODONE HYDROCHLORIDE 5 MG: 5 TABLET ORAL at 21:39

## 2020-06-15 RX ADMIN — KETOROLAC TROMETHAMINE 15 MG: 15 INJECTION, SOLUTION INTRAMUSCULAR; INTRAVENOUS at 17:05

## 2020-06-15 RX ADMIN — FENTANYL CITRATE 50 MCG: 50 INJECTION INTRAMUSCULAR; INTRAVENOUS at 14:35

## 2020-06-15 RX ADMIN — Medication 100 MCG: at 12:39

## 2020-06-15 RX ADMIN — Medication 100 MCG: at 13:09

## 2020-06-15 RX ADMIN — Medication 100 MCG: at 11:56

## 2020-06-15 RX ADMIN — FAMOTIDINE 20 MG: 10 INJECTION, SOLUTION INTRAVENOUS at 21:39

## 2020-06-15 RX ADMIN — ONDANSETRON 4 MG: 2 INJECTION, SOLUTION INTRAMUSCULAR; INTRAVENOUS at 13:45

## 2020-06-15 RX ADMIN — AMLODIPINE BESYLATE 10 MG: 10 TABLET ORAL at 08:46

## 2020-06-15 RX ADMIN — MIDAZOLAM HYDROCHLORIDE 2 MG: 1 INJECTION, SOLUTION INTRAMUSCULAR; INTRAVENOUS at 11:12

## 2020-06-15 RX ADMIN — Medication 100 MCG: at 12:18

## 2020-06-15 RX ADMIN — ACETAMINOPHEN 1000 MG: 500 TABLET ORAL at 17:05

## 2020-06-15 RX ADMIN — PIPERACILLIN AND TAZOBACTAM 3.38 G: 3; .375 INJECTION, POWDER, FOR SOLUTION INTRAVENOUS at 17:05

## 2020-06-15 RX ADMIN — Medication 4 MG: at 13:44

## 2020-06-15 RX ADMIN — ENOXAPARIN SODIUM 40 MG: 40 INJECTION SUBCUTANEOUS at 21:40

## 2020-06-15 RX ADMIN — METHOCARBAMOL TABLETS 750 MG: 750 TABLET, COATED ORAL at 21:39

## 2020-06-15 RX ADMIN — INDOCYANINE GREEN AND WATER 12.5 MG: KIT at 12:06

## 2020-06-15 RX ADMIN — FENTANYL CITRATE 50 MCG: 50 INJECTION, SOLUTION INTRAMUSCULAR; INTRAVENOUS at 11:12

## 2020-06-15 RX ADMIN — Medication 100 MCG: at 12:09

## 2020-06-15 ASSESSMENT — PULMONARY FUNCTION TESTS
PIF_VALUE: 21
PIF_VALUE: 23
PIF_VALUE: 21
PIF_VALUE: 29
PIF_VALUE: 23
PIF_VALUE: 1
PIF_VALUE: 23
PIF_VALUE: 23
PIF_VALUE: 28
PIF_VALUE: 22
PIF_VALUE: 0
PIF_VALUE: 28
PIF_VALUE: 25
PIF_VALUE: 21
PIF_VALUE: 29
PIF_VALUE: 27
PIF_VALUE: 1
PIF_VALUE: 10
PIF_VALUE: 30
PIF_VALUE: 28
PIF_VALUE: 30
PIF_VALUE: 23
PIF_VALUE: 4
PIF_VALUE: 25
PIF_VALUE: 27
PIF_VALUE: 29
PIF_VALUE: 23
PIF_VALUE: 30
PIF_VALUE: 21
PIF_VALUE: 19
PIF_VALUE: 18
PIF_VALUE: 21
PIF_VALUE: 22
PIF_VALUE: 27
PIF_VALUE: 24
PIF_VALUE: 10
PIF_VALUE: 29
PIF_VALUE: 10
PIF_VALUE: 21
PIF_VALUE: 24
PIF_VALUE: 19
PIF_VALUE: 29
PIF_VALUE: 30
PIF_VALUE: 24
PIF_VALUE: 21
PIF_VALUE: 28
PIF_VALUE: 20
PIF_VALUE: 29
PIF_VALUE: 29
PIF_VALUE: 27
PIF_VALUE: 25
PIF_VALUE: 29
PIF_VALUE: 28
PIF_VALUE: 2
PIF_VALUE: 23
PIF_VALUE: 28
PIF_VALUE: 25
PIF_VALUE: 23
PIF_VALUE: 11
PIF_VALUE: 22
PIF_VALUE: 21
PIF_VALUE: 29
PIF_VALUE: 27
PIF_VALUE: 26
PIF_VALUE: 28
PIF_VALUE: 23
PIF_VALUE: 25
PIF_VALUE: 20
PIF_VALUE: 17
PIF_VALUE: 23
PIF_VALUE: 9
PIF_VALUE: 29
PIF_VALUE: 19
PIF_VALUE: 23
PIF_VALUE: 18
PIF_VALUE: 21
PIF_VALUE: 28
PIF_VALUE: 22
PIF_VALUE: 10
PIF_VALUE: 24
PIF_VALUE: 9
PIF_VALUE: 24
PIF_VALUE: 21
PIF_VALUE: 27
PIF_VALUE: 25
PIF_VALUE: 27
PIF_VALUE: 25
PIF_VALUE: 22
PIF_VALUE: 17
PIF_VALUE: 26
PIF_VALUE: 26
PIF_VALUE: 23
PIF_VALUE: 13
PIF_VALUE: 26
PIF_VALUE: 24
PIF_VALUE: 18
PIF_VALUE: 20
PIF_VALUE: 2
PIF_VALUE: 11
PIF_VALUE: 26
PIF_VALUE: 24
PIF_VALUE: 29
PIF_VALUE: 29
PIF_VALUE: 24
PIF_VALUE: 26
PIF_VALUE: 19
PIF_VALUE: 19
PIF_VALUE: 29
PIF_VALUE: 30
PIF_VALUE: 21
PIF_VALUE: 26
PIF_VALUE: 29
PIF_VALUE: 24
PIF_VALUE: 7
PIF_VALUE: 4
PIF_VALUE: 21
PIF_VALUE: 29
PIF_VALUE: 8
PIF_VALUE: 23
PIF_VALUE: 28
PIF_VALUE: 18
PIF_VALUE: 18
PIF_VALUE: 28
PIF_VALUE: 20
PIF_VALUE: 21
PIF_VALUE: 27
PIF_VALUE: 28
PIF_VALUE: 19
PIF_VALUE: 1
PIF_VALUE: 29
PIF_VALUE: 27
PIF_VALUE: 30
PIF_VALUE: 25
PIF_VALUE: 1
PIF_VALUE: 19
PIF_VALUE: 25
PIF_VALUE: 29
PIF_VALUE: 23
PIF_VALUE: 25
PIF_VALUE: 23
PIF_VALUE: 27
PIF_VALUE: 27
PIF_VALUE: 26
PIF_VALUE: 22
PIF_VALUE: 10
PIF_VALUE: 1
PIF_VALUE: 18

## 2020-06-15 ASSESSMENT — PAIN DESCRIPTION - LOCATION
LOCATION: ABDOMEN
LOCATION: ABDOMEN

## 2020-06-15 ASSESSMENT — PAIN DESCRIPTION - DESCRIPTORS: DESCRIPTORS: ACHING;SORE

## 2020-06-15 ASSESSMENT — PAIN SCALES - GENERAL
PAINLEVEL_OUTOF10: 10
PAINLEVEL_OUTOF10: 8
PAINLEVEL_OUTOF10: 8
PAINLEVEL_OUTOF10: 10
PAINLEVEL_OUTOF10: 10
PAINLEVEL_OUTOF10: 7
PAINLEVEL_OUTOF10: 8
PAINLEVEL_OUTOF10: 0
PAINLEVEL_OUTOF10: 9
PAINLEVEL_OUTOF10: 8
PAINLEVEL_OUTOF10: 0
PAINLEVEL_OUTOF10: 10

## 2020-06-15 ASSESSMENT — PAIN DESCRIPTION - PROGRESSION: CLINICAL_PROGRESSION: GRADUALLY IMPROVING

## 2020-06-15 ASSESSMENT — PAIN DESCRIPTION - ORIENTATION: ORIENTATION: MID

## 2020-06-15 ASSESSMENT — PAIN - FUNCTIONAL ASSESSMENT: PAIN_FUNCTIONAL_ASSESSMENT: 0-10

## 2020-06-15 ASSESSMENT — PAIN DESCRIPTION - FREQUENCY: FREQUENCY: CONTINUOUS

## 2020-06-15 ASSESSMENT — PAIN DESCRIPTION - PAIN TYPE
TYPE: SURGICAL PAIN
TYPE: SURGICAL PAIN

## 2020-06-15 ASSESSMENT — PAIN DESCRIPTION - ONSET: ONSET: ON-GOING

## 2020-06-15 NOTE — ANESTHESIA PRE PROCEDURE
per day Rabia Dent MD   10 mL at 06/15/20 0847    sodium chloride flush 0.9 % injection 10 mL  10 mL Intravenous PRN Rabia Dent MD        morphine (PF) injection 2 mg  2 mg Intravenous Q2H PRN Rabia Dent MD        Or   Mabry morphine injection 4 mg  4 mg Intravenous Q2H PRN Rabia Dent MD        piperacillin-tazobactam (ZOSYN) 3.375 g in dextrose 5 % 50 mL IVPB extended infusion (mini-bag)  3.375 g Intravenous Vik Connors MD 12.5 mL/hr at 06/15/20 0845 3.375 g at 06/15/20 0845    hydrALAZINE (APRESOLINE) injection 10 mg  10 mg Intravenous Q6H PRN Rabia Dent MD   10 mg at 06/14/20 2040       Allergies:  No Known Allergies    Problem List:    Patient Active Problem List   Diagnosis Code    Right upper quadrant pain R10.11    Choledocholithiasis F91.45    Umbilical hernia P30.0    Essential hypertension I10    Morbid obesity E66.09    Hyperbilirubinemia E80.6       Past Medical History:        Diagnosis Date    Alcohol use disorder, mild, abuse     Choledocholithiasis 06/11/2020    HTN (hypertension)     Obesity     Pain 06/11/2020    Right upper quadrant pain    Umbilical hernia 05/79/0685    Weight loss 06/11/2020    has lost 43 # recently eating healthier ( over the last 4 months) in preperation for Gall bladder OR       Past Surgical History:        Procedure Laterality Date    ERCP N/A 6/12/2020    ERCP ENDOSCOPIC RETROGRADE CHOLANGIOPANCREATOGRAPHY - GI SCHEDULED performed by Rabia Dent MD at [address] [place] [address] History:    Social History     Tobacco Use    Smoking status: Former Smoker     Types: Cigarettes    Smokeless tobacco: Current User   Substance Use Topics    Alcohol use: Not on file                                Ready to quit: Not Answered  Counseling given: Not Answered      Vital Signs (Current):   Vitals:    06/14/20 1951 06/14/20 2241 06/15/20 0146 06/15/20 0745   BP: (!) 180/88 (!) 161/88 132/80 (!) 132/93   Pulse: 75 74 74 103   Resp: 16   18 Airway: Mallampati: III  TM distance: >3 FB   Neck ROM: full  Mouth opening: > = 3 FB Dental:          Pulmonary:Negative Pulmonary ROS and normal exam                               Cardiovascular:    (+) hypertension: no interval change,       ECG reviewed  Rhythm: regular  Rate: normal                    Neuro/Psych:   (+) psychiatric history:depression/anxiety             GI/Hepatic/Renal:   (+) morbid obesity          Endo/Other: Negative Endo/Other ROS                    Abdominal:   (+) obese,         Vascular: negative vascular ROS. Anesthesia Plan      general and regional     ASA 3       Induction: intravenous. Anesthetic plan and risks discussed with patient. Plan discussed with CRNA.                   Justina Grimaldo MD   6/15/2020

## 2020-06-15 NOTE — OP NOTE
12mm optiview trocar and laparoscope at Rosen's point in the left upper quadrant. The abdomen was insufflated with carbon dioxide to a pressure of 15 mmHg. A 8 mm port was then placed supra umbilicus under direct visualization. A camera was then placed and the abdomen was inspected to identify any injuries during placement of the umbilicus port. No injuries were noted. A 2nd 8 mm instrument port was placed 10 cm to the right lateral to the umbilicus. An assistant port was then placed 5 cm lateral to the right side instrument port. This was a 8 mm port. A 3rd 8 mm port placed 5cm to the left of the umbilicus. The patient was placed in reverse Trendelenburg position with the right side up. The [employer] machine was then docked and a camera placed without complication. A monopolar hook was then placed in the right arm and a fenestrated bipolar grasper was placed in the left arm. Filmy adhesions between the gallbladder and the omentum were lysed carefully with electrocautery. The fundus of the gallbladder was grasped and directed towards the patient's right shoulder. The infundibulum of the gallbladder was grasped with a bipolar grasper. The gallbladder was positioned so that the cystic duct was at a right angle to the common bile duct in order to expose Calot's triangle. The peritoneum between the gallbladder and the liver was taken down by electrocautery to further mobilize the triangle. The fibrous tissue was hooked with meticulous electrocautery. Hook electrocautery was used to identify the cystic artery. The gallbladder was noted to be long with long neck and floppy infundibulum. ICG was administered and firefly activated. The common bile duct was fluoresced while the cystic duct did not. Do do the long gallbladder the cystic artery was taken and the neck of gallbladder skeletonized. All fat and fibrous tissue was dissected from the cystohepatic triangle with careful hook electrocautery.  The cystic plate of the liver was skeletonized with hook electrocautery. Critical view obtained. The cystic artery was ligated with a Hemoclip and divided with Endoshears. Do to the length of the neck and inability to safely dissect lower decision was made to staple across the gallbladder with a endo 60 mm blue load stapler. The gallbladder was dissected from the liver bed with electrocautery. The gallbladder fossa was inspected and the hemostasis was noted. The gallbladder was then placed in the right upper quadrant and attention was turned to the umbilical hernia. The robot was undocked and the two right sided ports removed under direct visualization. The skin stapled in order to maintain pneumoperitoneum. The patient was flattened out and rotated to right side down. An additional 8mm trocar was placed in the left lower quadrant under direct visualization. The robot was then re-docked. A prograsp and fenestrated were placed in the left and right arm the camera in the center. The hernia was noted to contain omentum without bowel or colon. The hernia was reduced, no evidence of omental ischemia was noted. Once the hernia was reduced the right arm prograsp was changed for scissors. The peritoneum was scored on the left lateral edge and the fascial edges identified. The hernia was sac was reduced and ligated. The fascial defect was approximately 1-2 cm. Due to this we elected to close the hernia primarily. 0 v lock suture was used to close the fascia from intra-abdominal in a running fashion from superior to inferior and a second suture used to close inferior to superior. From the outside a stab incision was made along the right lateral aspect of the hernia using a suture passer and 0 vicryl to reinforce the hernia under direct visualization. Once the fascia was closed the robot was undocked and an endocatch bag placed. The gallbladder and hernia sac were placed into the endocatch bag and removed.  The left upper

## 2020-06-15 NOTE — PROGRESS NOTES
Cuco Mirza 18    Progress Note    6/15/2020    11:37 AM    Name:   Betty Cruz  MRN:     [id number]     Acct:      [de-identified]   Room:   [facility] OR [place] [facility]  IP Day:  4  Admit Date:  6/11/2020  3:17 PM    PCP:   Smita Frank DO  Code Status:  Full Code    Subjective:     C/C: No chief complaint on file. RUQ pain  Interval History Status: improved. Patient is being planned for cholecystectomy and umbilical hernia repair today  Patient had issues with elevated blood pressure last night and had to be given hydralazine, denies chest pain, denies shortness of breath    Brief History: This is 53years old gentleman who was transferred to us from [facility].  Patient presented there with right upper quadrant pain. Found to be having choledocholithiasis as well as umbilical hernia. Admitted started on IV fluids and Zosyn. Patient underwent ERCP with removal of 2 stones. This resulted in improvement in his LFTs. Patient scheduled for robotic cholecystectomy and umbilical hernia repair. Also he had elevated blood pressure started on amlodipine and lisinopril. Review of Systems:     Constitutional:  negative for chills, fevers, sweats  Respiratory:  negative for cough, dyspnea on exertion, shortness of breath, wheezing  Cardiovascular:  negative for chest pain, chest pressure/discomfort, lower extremity edema, palpitations  Gastrointestinal:  negative for abdominal pain, constipation, diarrhea, nausea, vomiting  Neurological:  negative for dizziness, headache    Medications:      Allergies:  No Known Allergies    Current Meds:   Scheduled Meds:    sodium chloride flush  10 mL Intravenous 2 times per day    [START ON 6/16/2020] lisinopril  10 mg Oral Daily    [MAR Hold] amLODIPine  10 mg Oral Daily    [MAR Hold] enoxaparin  40 mg Subcutaneous BID    [MAR Hold] famotidine (PEPCID) injection  20 mg Intravenous BID    [facility] CREATININE 0.95 0.82   ANIONGAP 9 14   LABGLOM >60 >60   GFRAA >60 >60   CALCIUM 8.8 8.8     Recent Labs     06/13/20  0735 06/15/20  0549   PROT 7.1 6.9   LABALBU 3.6 3.4*   AST 48* 39   * 78*   ALKPHOS 222* 175*   BILITOT 1.10 0.76   BILIDIR  --  0.34*     ABG:No results found for: POCPH, PHART, PH, POCPCO2, BWA8XKR, PCO2, POCPO2, PO2ART, PO2, POCHCO3, ZFB7QJG, HCO3, NBEA, PBEA, BEART, BE, THGBART, THB, KIE9XMG, XPKR3GYX, U2AGMGSR, O2SAT, FIO2  No results found for: SPECIAL  No results found for: CULTURE    Radiology:  [facility] Gallbladder Ruq    Result Date: 6/11/2020  Limited study due to body habitus and overlying bowel gas. Diffuse hepatic steatosis. Cholelithiasis without sonographic evidence of acute cholecystitis. There is dilation of the common bile duct. No obvious stone is noted however correlation with patient's liver function test as well as bilirubin levels recommended. MRCP can be obtained as clinically indicated. Physical Examination:        General appearance:  alert, cooperative and no distress  Mental Status:  oriented to person, place and time and normal affect  Lungs:  clear to auscultation bilaterally, normal effort  Heart:  regular rate and rhythm, no murmur  Abdomen:  soft, nontender, nondistended, normal bowel sounds, no masses, hepatomegaly, splenomegaly  Extremities:  no edema, redness, tenderness in the calves  Skin:  no gross lesions, rashes, induration    Assessment:        Hospital Problems           Last Modified POA    Right upper quadrant pain 6/10/2020 Yes    Choledocholithiasis 7/77/9288 Yes    Umbilical hernia 0/67/6867 Yes    Essential hypertension 6/11/2020 Yes    Morbid obesity 6/12/2020 Yes    Hyperbilirubinemia 6/12/2020 Yes          Plan:        1. Status post ERCP with removal of 2 stones, bilirubin is improving   2. continue Zosyn  3.  Blood pressure is elevated, continue Norvasc and increase the dose of lisinopril   4. plan for cholecystectomy and umbilical

## 2020-06-16 VITALS
RESPIRATION RATE: 18 BRPM | TEMPERATURE: 97.8 F | OXYGEN SATURATION: 96 % | WEIGHT: 315 LBS | BODY MASS INDEX: 44.1 KG/M2 | HEART RATE: 93 BPM | SYSTOLIC BLOOD PRESSURE: 143 MMHG | HEIGHT: 71 IN | DIASTOLIC BLOOD PRESSURE: 96 MMHG

## 2020-06-16 PROBLEM — Z90.49 S/P LAPAROSCOPIC CHOLECYSTECTOMY: Status: ACTIVE | Noted: 2020-06-16

## 2020-06-16 PROBLEM — I83.93 ASYMPTOMATIC VARICOSE VEINS OF BOTH LOWER EXTREMITIES: Status: ACTIVE | Noted: 2020-06-16

## 2020-06-16 PROBLEM — R10.11 RIGHT UPPER QUADRANT PAIN: Status: RESOLVED | Noted: 2020-06-10 | Resolved: 2020-06-16

## 2020-06-16 PROBLEM — K76.0 HEPATIC STEATOSIS: Status: ACTIVE | Noted: 2020-06-16

## 2020-06-16 LAB
ABSOLUTE EOS #: <0.03 K/UL (ref 0–0.44)
ABSOLUTE IMMATURE GRANULOCYTE: 0.05 K/UL (ref 0–0.3)
ABSOLUTE LYMPH #: 0.95 K/UL (ref 1.1–3.7)
ABSOLUTE MONO #: 0.57 K/UL (ref 0.1–1.2)
ALBUMIN SERPL-MCNC: 3.6 G/DL (ref 3.5–5.2)
ALBUMIN/GLOBULIN RATIO: 1 (ref 1–2.5)
ALP BLD-CCNC: 170 U/L (ref 40–129)
ALT SERPL-CCNC: 101 U/L (ref 5–41)
ANION GAP SERPL CALCULATED.3IONS-SCNC: 15 MMOL/L (ref 9–17)
AST SERPL-CCNC: 58 U/L
BASOPHILS # BLD: 0 % (ref 0–2)
BASOPHILS ABSOLUTE: <0.03 K/UL (ref 0–0.2)
BILIRUB SERPL-MCNC: 0.62 MG/DL (ref 0.3–1.2)
BILIRUBIN DIRECT: 0.28 MG/DL
BILIRUBIN, INDIRECT: 0.34 MG/DL (ref 0–1)
BUN BLDV-MCNC: 22 MG/DL (ref 6–20)
CALCIUM SERPL-MCNC: 9.1 MG/DL (ref 8.6–10.4)
CHLORIDE BLD-SCNC: 103 MMOL/L (ref 98–107)
CHOLESTEROL/HDL RATIO: 6.3
CHOLESTEROL: 209 MG/DL
CO2: 17 MMOL/L (ref 20–31)
CREAT SERPL-MCNC: 1.17 MG/DL (ref 0.7–1.2)
DIFFERENTIAL TYPE: ABNORMAL
EOSINOPHILS RELATIVE PERCENT: 0 % (ref 1–4)
ESTIMATED AVERAGE GLUCOSE: 114 MG/DL
GFR AFRICAN AMERICAN: >60 ML/MIN
GFR NON-AFRICAN AMERICAN: >60 ML/MIN
GFR SERPL CREATININE-BSD FRML MDRD: ABNORMAL ML/MIN/{1.73_M2}
GFR SERPL CREATININE-BSD FRML MDRD: ABNORMAL ML/MIN/{1.73_M2}
GLUCOSE BLD-MCNC: 220 MG/DL (ref 70–99)
HBA1C MFR BLD: 5.6 % (ref 4–6)
HCT VFR BLD CALC: 45.6 % (ref 40.7–50.3)
HDLC SERPL-MCNC: 33 MG/DL
HEMOGLOBIN: 14.8 G/DL (ref 13–17)
IMMATURE GRANULOCYTES: 0 %
LDL CHOLESTEROL: 142 MG/DL (ref 0–130)
LYMPHOCYTES # BLD: 7 % (ref 24–43)
MCH RBC QN AUTO: 31.2 PG (ref 25.2–33.5)
MCHC RBC AUTO-ENTMCNC: 32.5 G/DL (ref 28.4–34.8)
MCV RBC AUTO: 96 FL (ref 82.6–102.9)
MONOCYTES # BLD: 4 % (ref 3–12)
NRBC AUTOMATED: 0 PER 100 WBC
PDW BLD-RTO: 13.2 % (ref 11.8–14.4)
PLATELET # BLD: 284 K/UL (ref 138–453)
PLATELET ESTIMATE: ABNORMAL
PMV BLD AUTO: 9.3 FL (ref 8.1–13.5)
POTASSIUM SERPL-SCNC: 4.4 MMOL/L (ref 3.7–5.3)
RBC # BLD: 4.75 M/UL (ref 4.21–5.77)
RBC # BLD: ABNORMAL 10*6/UL
SEG NEUTROPHILS: 88 % (ref 36–65)
SEGMENTED NEUTROPHILS ABSOLUTE COUNT: 11.9 K/UL (ref 1.5–8.1)
SODIUM BLD-SCNC: 135 MMOL/L (ref 135–144)
SURGICAL PATHOLOGY REPORT: NORMAL
TOTAL PROTEIN: 7.2 G/DL (ref 6.4–8.3)
TRIGL SERPL-MCNC: 168 MG/DL
TSH SERPL DL<=0.05 MIU/L-ACNC: 0.54 MIU/L (ref 0.3–5)
VLDLC SERPL CALC-MCNC: ABNORMAL MG/DL (ref 1–30)
WBC # BLD: 13.5 K/UL (ref 3.5–11.3)
WBC # BLD: ABNORMAL 10*3/UL

## 2020-06-16 PROCEDURE — 80053 COMPREHEN METABOLIC PANEL: CPT

## 2020-06-16 PROCEDURE — 36415 COLL VENOUS BLD VENIPUNCTURE: CPT

## 2020-06-16 PROCEDURE — 6370000000 HC RX 637 (ALT 250 FOR IP): Performed by: STUDENT IN AN ORGANIZED HEALTH CARE EDUCATION/TRAINING PROGRAM

## 2020-06-16 PROCEDURE — 83036 HEMOGLOBIN GLYCOSYLATED A1C: CPT

## 2020-06-16 PROCEDURE — 84443 ASSAY THYROID STIM HORMONE: CPT

## 2020-06-16 PROCEDURE — 2580000003 HC RX 258: Performed by: STUDENT IN AN ORGANIZED HEALTH CARE EDUCATION/TRAINING PROGRAM

## 2020-06-16 PROCEDURE — 80061 LIPID PANEL: CPT

## 2020-06-16 PROCEDURE — 82248 BILIRUBIN DIRECT: CPT

## 2020-06-16 PROCEDURE — 99239 HOSP IP/OBS DSCHRG MGMT >30: CPT | Performed by: INTERNAL MEDICINE

## 2020-06-16 PROCEDURE — 85025 COMPLETE CBC W/AUTO DIFF WBC: CPT

## 2020-06-16 PROCEDURE — 6360000002 HC RX W HCPCS: Performed by: STUDENT IN AN ORGANIZED HEALTH CARE EDUCATION/TRAINING PROGRAM

## 2020-06-16 RX ORDER — LISINOPRIL 20 MG/1
20 TABLET ORAL DAILY
Qty: 30 TABLET | Refills: 2 | Status: SHIPPED | OUTPATIENT
Start: 2020-06-16 | End: 2020-07-16

## 2020-06-16 RX ORDER — HYDROCHLOROTHIAZIDE 12.5 MG/1
12.5 CAPSULE, GELATIN COATED ORAL EVERY MORNING
Qty: 30 CAPSULE | Refills: 5 | Status: SHIPPED | OUTPATIENT
Start: 2020-06-16

## 2020-06-16 RX ADMIN — AMLODIPINE BESYLATE 10 MG: 10 TABLET ORAL at 09:28

## 2020-06-16 RX ADMIN — METHOCARBAMOL TABLETS 750 MG: 750 TABLET, COATED ORAL at 04:44

## 2020-06-16 RX ADMIN — KETOROLAC TROMETHAMINE 15 MG: 15 INJECTION, SOLUTION INTRAMUSCULAR; INTRAVENOUS at 04:44

## 2020-06-16 RX ADMIN — PIPERACILLIN AND TAZOBACTAM 3.38 G: 3; .375 INJECTION, POWDER, FOR SOLUTION INTRAVENOUS at 00:57

## 2020-06-16 RX ADMIN — ACETAMINOPHEN 1000 MG: 500 TABLET ORAL at 06:01

## 2020-06-16 RX ADMIN — LISINOPRIL 10 MG: 10 TABLET ORAL at 09:28

## 2020-06-16 RX ADMIN — METHOCARBAMOL TABLETS 750 MG: 750 TABLET, COATED ORAL at 09:28

## 2020-06-16 ASSESSMENT — PAIN SCALES - GENERAL
PAINLEVEL_OUTOF10: 7
PAINLEVEL_OUTOF10: 5
PAINLEVEL_OUTOF10: 3
PAINLEVEL_OUTOF10: 5

## 2020-06-16 NOTE — DISCHARGE SUMMARY
He is alert and oriented to person, place, and time. Mental status is at baseline. Cranial Nerves: No cranial nerve deficit. Sensory: No sensory deficit. Motor: No weakness. Coordination: Coordination normal.      Gait: Gait normal.      Deep Tendon Reflexes: Reflexes normal.   Psychiatric:         Mood and Affect: Mood normal.         Behavior: Behavior normal.         Thought Content: Thought content normal.         Judgment: Judgment normal.         CBC:    Lab Results   Component Value Date    WBC 13.5 06/16/2020    HGB 14.8 06/16/2020    HCT 45.6 06/16/2020     06/16/2020       Renal:    Lab Results   Component Value Date     06/16/2020    K 4.4 06/16/2020     06/16/2020    CO2 17 06/16/2020    BUN 22 06/16/2020    CREATININE 1.17 06/16/2020    CALCIUM 9.1 06/16/2020    PHOS 2.3 06/12/2020       URINALYSIS. None. MICROBIOLOGY AND PATHOLOGY STUDIES. Results for Chinedu Eng (MRN [id number]) as of 6/16/2020 07:56   Ref. Range 6/11/2020 20:15   SARS-CoV-2 Latest Ref Range: Not Detected  Not Detected        TOXICOLOGY. None. PROCEDURES. LAPAROSCOPIC XI ROBOTIC CHOLECYSTECTOMY and  UMBILICAL HERNIA   YT-9/41/2279. ENDOSCOPE STUDIES. ERCP with  Cholangiogram, sphincterotomy and balloon extraction of stones-6/12/2020. RADIOLOGY STUDIES. US Gall Bladder -6/11/2020. IMPRESSION:  Limited study due to body habitus and overlying bowel gas.     Diffuse hepatic steatosis.     Cholelithiasis without sonographic evidence of acute cholecystitis.     There is dilation of the common bile duct. No obvious stone is noted however correlation with patient's liver function test as   well as bilirubin levels recommended. MRCP can be obtained as clinically indicated.        Consults:     IP CONSULT TO GENERAL SURGERY  IP CONSULT TO GI  IP CONSULT TO IV TEAM    Disposition:    [x] Home       [] TCU       [] Rehab       [] Psych       [] SNF       [] [facility]

## 2020-06-16 NOTE — PLAN OF CARE
Problem: Activity:  Goal: Risk for activity intolerance will decrease  Description: Risk for activity intolerance will decrease  6/16/2020 0535 by Alonso Smith RN  Outcome: Ongoing  6/15/2020 1636 by Soy Arevalo RN  Outcome: Not Met This Shift     Problem:  Bowel/Gastric:  Goal: Bowel function will improve  Description: Bowel function will improve  6/16/2020 0535 by Alonso Smith RN  Outcome: Ongoing  6/15/2020 1636 by Soy Arevalo RN  Outcome: Not Met This Shift  Goal: Diagnostic test results will improve  Description: Diagnostic test results will improve  6/16/2020 0535 by Alonso Smith RN  Outcome: Ongoing  6/15/2020 1636 by Soy Arevalo RN  Outcome: Not Met This Shift  Goal: Occurrences of nausea will decrease  Description: Occurrences of nausea will decrease  6/16/2020 0535 by Alonso Smith RN  Outcome: Ongoing  6/15/2020 1636 by Soy Arevalo RN  Outcome: Not Met This Shift  Goal: Occurrences of vomiting will decrease  Description: Occurrences of vomiting will decrease  6/16/2020 0535 by Alonso Smith RN  Outcome: Ongoing  6/15/2020 1636 by Soy Arevalo RN  Outcome: Not Met This Shift     Problem: Cardiac:  Goal: Ability to maintain an adequate cardiac output will improve  Description: Ability to maintain an adequate cardiac output will improve  6/16/2020 0535 by Alonso Smith RN  Outcome: Ongoing  6/15/2020 1636 by Soy Arevalo RN  Outcome: Not Met This Shift     Problem: Physical Regulation:  Goal: Complications related to the disease process, condition or treatment will be avoided or minimized  Description: Complications related to the disease process, condition or treatment will be avoided or minimized  6/16/2020 0535 by Alonso Smith RN  Outcome: Ongoing  6/15/2020 1636 by Soy Arevalo RN  Outcome: Not Met This Shift     Problem: Safety:  Goal: Ability to remain free from injury will improve  6/16/2020 0535 by Alonso Smith RN  Outcome: Ongoing  6/15/2020 1636 by Sonya Coleman RN  Outcome: Not Met This Shift     Problem: Pain:  Goal: Pain level will decrease  Description: Pain level will decrease  6/16/2020 0535 by Tahir Palacios RN  Outcome: Ongoing  6/15/2020 1636 by Sonya Coleman RN  Outcome: Not Met This Shift  Goal: Control of acute pain  Description: Control of acute pain  6/16/2020 0535 by Tahir Palacios RN  Outcome: Ongoing  6/15/2020 1636 by Sonya Coleman RN  Outcome: Not Met This Shift  Goal: Control of chronic pain  Description: Control of chronic pain  6/16/2020 0535 by Tahir Palacios RN  Outcome: Ongoing  6/15/2020 1636 by Sonya Coleman RN  Outcome: Not Met This Shift

## 2020-06-24 ENCOUNTER — OFFICE VISIT (OUTPATIENT)
Dept: SURGERY | Age: 55
End: 2020-06-24
Payer: MEDICAID

## 2020-06-24 VITALS
WEIGHT: 315 LBS | BODY MASS INDEX: 44.1 KG/M2 | HEIGHT: 71 IN | TEMPERATURE: 97.2 F | HEART RATE: 60 BPM | SYSTOLIC BLOOD PRESSURE: 151 MMHG | DIASTOLIC BLOOD PRESSURE: 80 MMHG

## 2020-06-24 PROCEDURE — 99024 POSTOP FOLLOW-UP VISIT: CPT | Performed by: STUDENT IN AN ORGANIZED HEALTH CARE EDUCATION/TRAINING PROGRAM

## 2022-03-10 NOTE — OP NOTE
ERCP      Patient:   Mary Choudhary   :    1965  Acc#:    [id number]   Referring/PCP: Torito Talley DO  Facility:   [address] [place] [address]  Date:     2020   Endoscopist:  Astrid Riddle MD, [facility]    Procedure: ERCP with  Cholangiogram, sphincterotomy and balloon extraction of stones        Indication: Suspected CBD stone, Dilated CBD and bilirubin above 4    Post procedure diagnosis: CBD stones. 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 The risks were discussed including pancreatitis, bleeding, and perforation. After the patient was placed in the prone position eved, the therapeutic duodenoscope scope was inserted into the mouth and advanced to the second portion of the duodenum allowing the papilla to be visualized. Using the a wire guided approach with the sphincterotome, the CBD was cannulated and a cholangiogram was performed. Findings: The initial cholangiogram revealed filling defects in cbd. CBD was 8 mm    A 8 sphincterotomy was performed. The sphincterotome was exchanged, over a wire for a 12 mm below injection balloon. Multiple balloon sweeps were performed revealing 2 8 mm black stones    A terminal balloon occlusion cholangiongram was normal    The duodenoscope was removed and the patient tolerated the procedure well. The pancreatic duct was not manipulated during the procedure. Plan: Low fat diet  Proceed with cholecystectomy. Patient was counseled to get outpatient screening colonoscopy.      Electronically signed by Astrid Riddle MD, FAC on 2020 at 2:24 PM Oral dysphagia noted for assessed solids, otherwise skills grossly WFL for puree & thin fluids. Pharyngeal stage of swallow clinically unremarkable for assessed PO with NO overt s/s aspiration noted post intake

## (undated) DEVICE — GLOVE ORANGE PI 7 1/2   MSG9075

## (undated) DEVICE — BLADE CLIPPER GEN PURP NS

## (undated) DEVICE — SPHINCTEROTOME: Brand: DREAMTOME™ RX 44

## (undated) DEVICE — GLOVE SURG SZ 6 THK91MIL LTX FREE SYN POLYISOPRENE ANTI

## (undated) DEVICE — SUTURE VCRL + SZ 0 L27IN ABSRB VLT L26MM UR-6 5/8 CIR VCP603H

## (undated) DEVICE — BAG SPEC REM 224ML W4XL6IN DIA10MM 1 HND GYN DISP ENDOPCH

## (undated) DEVICE — Device

## (undated) DEVICE — GOWN,AURORA,NONREINFORCED,LARGE: Brand: MEDLINE

## (undated) DEVICE — RELOAD STPL L60MM H1.5-3.6MM REG TISS BLU GRIPPING SURF B

## (undated) DEVICE — GLOVE SURG SZ 65 THK91MIL LTX FREE SYN POLYISOPRENE

## (undated) DEVICE — RETRIEVAL BALLOON CATHETER: Brand: EXTRACTOR™ PRO RX

## (undated) DEVICE — SUTURE MCRYL SZ 4-0 L18IN ABSRB UD L19MM PS-2 3/8 CIR PRIM Y496G

## (undated) DEVICE — APPLICATOR MEDICATED 26 CC SOLUTION HI LT ORNG CHLORAPREP

## (undated) DEVICE — SUTURE SZ 0 27IN 5/8 CIR UR-6  TAPER PT VIOLET ABSRB VICRYL J603H

## (undated) DEVICE — TOWEL,OR,DSP,ST,NATURAL,DLX,4/PK,20PK/CS: Brand: MEDLINE

## (undated) DEVICE — SYRINGE MED 10ML LUERLOCK TIP W/O SFTY DISP

## (undated) DEVICE — ADHESIVE SKIN CLSR 0.7ML TOP DERMBND ADV

## (undated) DEVICE — TROCAR: Brand: KII FIOS FIRST ENTRY

## (undated) DEVICE — SUTURE MCRYL SZ 4-0 L18IN ABSRB UD L16MM PC-3 3/8 CIR PRIM Y845G

## (undated) DEVICE — NEEDLE SYR 18GA L1.5IN RED PLAS HUB S STL BLNT FILL W/O

## (undated) DEVICE — GLOVE ORANGE PI 7   MSG9070

## (undated) DEVICE — Z DUP USE 2641840 CLIP INT L POLYMER LOK LIG HEM O LOK

## (undated) DEVICE — GARMENT,MEDLINE,DVT,INT,CALF,MED, GEN2: Brand: MEDLINE

## (undated) DEVICE — CONNECTOR,TUBING,5-IN-1,NON-STERILE: Brand: MEDLINE INDUSTRIES, INC.

## (undated) DEVICE — INSUFFLATION TUBING SET WITH FILTER, FUNNEL CONNECTOR AND LUER LOCK: Brand: JOSNOE MEDICAL INC

## (undated) DEVICE — DRESSING TRNSPAR W5XL4.5IN FLM SHT SEMIPERMEABLE WIND

## (undated) DEVICE — DEVICE TRCR 12X9X3IN WHT CLSR DISP OMNICLOSE

## (undated) DEVICE — SUTURE MCRYL + SZ 4-0 L18IN ABSRB UD L19MM PS-2 3/8 CIR MCP496G

## (undated) DEVICE — SOLUTION ANTIFOG VIS SYS CLEARIFY LAPSCP

## (undated) DEVICE — ARM DRAPE

## (undated) DEVICE — BINDER ABD XL W15IN 62 74IN CIRC UNISX 5 PNL E CNTCT CLSR

## (undated) DEVICE — SUTURE V-LOC 180 SZ 0 L9IN ABSRB GRN GS-21 L37MM 1/2 CIR VLOCL0346

## (undated) DEVICE — NEEDLE ECHOGENIC 20GA L4IN INSUL W/ 30DEG BVL EXTN SET

## (undated) DEVICE — NEEDLE HYPO 25GA L1.5IN BLU POLYPR HUB S STL REG BVL STR

## (undated) DEVICE — TIP COVER ACCESSORY

## (undated) DEVICE — SCISSOR SURG METZ CRV TIP

## (undated) DEVICE — MITT SURG PREP L ADH DISPOSABLE

## (undated) DEVICE — GEL US 20GM NONIRRITATING OVERWRAPPED FILE PCH TRNSMIT

## (undated) DEVICE — COVER,LIGHT HANDLE,FLX,2/PK: Brand: MEDLINE INDUSTRIES, INC.

## (undated) DEVICE — BLADELESS OBTURATOR: Brand: WECK VISTA

## (undated) DEVICE — STERILE COTTON BALLS LARGE 5/P: Brand: MEDLINE

## (undated) DEVICE — CANNULA SEAL